# Patient Record
Sex: FEMALE | Race: WHITE | Employment: OTHER | URBAN - METROPOLITAN AREA
[De-identification: names, ages, dates, MRNs, and addresses within clinical notes are randomized per-mention and may not be internally consistent; named-entity substitution may affect disease eponyms.]

---

## 2017-02-17 ENCOUNTER — TELEPHONE (OUTPATIENT)
Dept: INTERNAL MEDICINE CLINIC | Age: 77
End: 2017-02-17

## 2017-02-17 DIAGNOSIS — M15.9 PRIMARY OSTEOARTHRITIS INVOLVING MULTIPLE JOINTS: Primary | ICD-10-CM

## 2017-02-17 RX ORDER — TRAMADOL HYDROCHLORIDE 50 MG/1
50 TABLET ORAL
Qty: 50 TAB | Refills: 0 | OUTPATIENT
Start: 2017-02-17 | End: 2017-10-30

## 2017-02-17 NOTE — TELEPHONE ENCOUNTER
Patient reports OTC tylenol arthritis and motrin are helping but some days neck pain is worse than others and would like a refill on her tramadol 50 mg tablets. Patient informed this was d/c at 9/2016 visit r/t not a current med but patient states she only takes it if absolutely needed and OTC meds are not helping. Please advise.

## 2017-05-15 ENCOUNTER — HOSPITAL ENCOUNTER (OUTPATIENT)
Dept: MAMMOGRAPHY | Age: 77
Discharge: HOME | End: 2017-05-15
Payer: MEDICARE

## 2017-05-15 DIAGNOSIS — Z12.31 VISIT FOR SCREENING MAMMOGRAM: ICD-10-CM

## 2017-05-15 PROCEDURE — G0202 SCR MAMMO BI INCL CAD: HCPCS

## 2017-06-15 DIAGNOSIS — F41.8 SITUATIONAL ANXIETY: ICD-10-CM

## 2017-06-15 RX ORDER — ALPRAZOLAM 0.25 MG/1
0.25 TABLET ORAL
Qty: 60 TAB | Refills: 0 | OUTPATIENT
Start: 2017-06-15 | End: 2017-08-28 | Stop reason: SDUPTHER

## 2017-06-15 NOTE — TELEPHONE ENCOUNTER
From: Beverly Butler  To:  Kenya Sy MD  Sent: 6/15/2017 7:35 AM EDT  Subject: Medication Renewal Request    Original authorizing provider: MD Beverly Jensen would like a refill of the following medications:  ALPRAZolam Penne Macho) 0.25 mg tablet Kenya Sy MD]    [facility] [phone]    Comment:  [facility] [address] [place] [address]

## 2017-08-28 DIAGNOSIS — F41.8 SITUATIONAL ANXIETY: ICD-10-CM

## 2017-08-28 RX ORDER — FAMOTIDINE 20 MG/1
20 TABLET, FILM COATED ORAL 2 TIMES DAILY
Qty: 60 TAB | Refills: 5 | Status: SHIPPED | OUTPATIENT
Start: 2017-08-28 | End: 2018-10-30

## 2017-08-28 RX ORDER — ALPRAZOLAM 0.25 MG/1
0.25 TABLET ORAL
Qty: 60 TAB | Refills: 0 | OUTPATIENT
Start: 2017-08-28 | End: 2017-10-30 | Stop reason: SDUPTHER

## 2017-08-28 NOTE — TELEPHONE ENCOUNTER
From: Kevin Purvis  To: Maranda Harding MD  Sent: 8/28/2017 8:07 AM EDT  Subject: Medication Renewal Request    Original authorizing provider: MD Kevin Santillan would like a refill of the following medications:  ALPRAZolam Brown Shaper) 0.25 mg tablet Maranda Harding MD]    Preferred pharmacy: [facility] #[id number] [facility] [address]    Comment:  Is it possible to refill a prescription that I have not used for about 3-4 years? My stomach has been acting like it did when I had acid reflux and I was prescribed Omeprazole, I would like to have it refilled if possible. We are now using the [facility] in [place] which allows us 3 months at a time on our prescriptions with our insurance.  My next appointment is October 30, 2017

## 2017-09-25 RX ORDER — FAMOTIDINE 20 MG/1
20 TABLET, FILM COATED ORAL 2 TIMES DAILY
Qty: 60 TAB | Refills: 5 | Status: CANCELLED | OUTPATIENT
Start: 2017-09-25

## 2017-10-30 ENCOUNTER — OFFICE VISIT (OUTPATIENT)
Dept: INTERNAL MEDICINE CLINIC | Age: 77
End: 2017-10-30

## 2017-10-30 VITALS
OXYGEN SATURATION: 99 % | HEART RATE: 57 BPM | BODY MASS INDEX: 20.58 KG/M2 | DIASTOLIC BLOOD PRESSURE: 62 MMHG | HEIGHT: 61 IN | RESPIRATION RATE: 16 BRPM | TEMPERATURE: 96.4 F | WEIGHT: 109 LBS | SYSTOLIC BLOOD PRESSURE: 129 MMHG

## 2017-10-30 DIAGNOSIS — E78.00 HYPERCHOLESTEROLEMIA: ICD-10-CM

## 2017-10-30 DIAGNOSIS — I47.1 SUPRAVENTRICULAR TACHYCARDIA (HCC): ICD-10-CM

## 2017-10-30 DIAGNOSIS — R20.0 NUMBNESS AND TINGLING OF BOTH FEET: ICD-10-CM

## 2017-10-30 DIAGNOSIS — G47.09 OTHER INSOMNIA: ICD-10-CM

## 2017-10-30 DIAGNOSIS — R20.2 NUMBNESS AND TINGLING OF BOTH FEET: ICD-10-CM

## 2017-10-30 DIAGNOSIS — Z00.00 ANNUAL PHYSICAL EXAM: Primary | ICD-10-CM

## 2017-10-30 DIAGNOSIS — F41.8 SITUATIONAL ANXIETY: ICD-10-CM

## 2017-10-30 RX ORDER — ZOLPIDEM TARTRATE 6.25 MG/1
6.25 TABLET, FILM COATED, EXTENDED RELEASE ORAL
Qty: 30 TAB | Refills: 2 | OUTPATIENT
Start: 2017-10-30 | End: 2018-03-27 | Stop reason: SDUPTHER

## 2017-10-30 RX ORDER — MELOXICAM 15 MG/1
TABLET ORAL
Refills: 6 | COMMUNITY
Start: 2017-10-12 | End: 2018-10-30 | Stop reason: SDUPTHER

## 2017-10-30 RX ORDER — FLUVASTATIN 20 MG/1
CAPSULE ORAL
Qty: 90 CAP | Refills: 3 | Status: SHIPPED | OUTPATIENT
Start: 2017-10-30 | End: 2018-03-27 | Stop reason: SDUPTHER

## 2017-10-30 RX ORDER — METOPROLOL SUCCINATE 25 MG/1
TABLET, EXTENDED RELEASE ORAL
Qty: 45 TAB | Refills: 3 | Status: SHIPPED | OUTPATIENT
Start: 2017-10-30 | End: 2018-03-27 | Stop reason: SDUPTHER

## 2017-10-30 RX ORDER — ALPRAZOLAM 0.25 MG/1
0.25 TABLET ORAL
Qty: 60 TAB | Refills: 1 | OUTPATIENT
Start: 2017-10-30 | End: 2018-03-27 | Stop reason: SDUPTHER

## 2017-10-30 NOTE — MR AVS SNAPSHOT
Visit Information Date & Time Provider Department Dept. Phone Encounter #  
 10/30/2017 10:30 AM Hailey Llamas MD Alvina Dolan [phone] [id number] Follow-up Instructions Return in about 1 year (around 10/30/2018) for Yearly exam, chol,  Fasting lab soon. Upcoming Health Maintenance Date Due  
 GLAUCOMA SCREENING Q2Y 6/23/2016 MEDICARE YEARLY EXAM 9/13/2017 DTaP/Tdap/Td series (2 - Td) 12/7/2017 BREAST CANCER SCRN MAMMOGRAM 5/15/2018 COLONOSCOPY 9/27/2023 Allergies as of 10/30/2017  Review Complete On: 10/30/2017 By: Jeanette Beckman LPN Severity Noted Reaction Type Reactions Zanaflex [Tizanidine]  03/09/2015   Side Effect Other (comments) Tired and head \"felt like it full of cotton. \" Current Immunizations  Reviewed on 10/30/2017 Name Date Influenza High Dose Vaccine PF 8/10/2017, 9/19/2016, 10/14/2015, 9/25/2014, 10/2/2013 Influenza Vaccine Split 11/1/2010 Pneumococcal Conjugate (PCV-13) 3/9/2015 TD Vaccine 2/17/2004 TDAP Vaccine 12/7/2007 ZZZ-RETIRED (DO NOT USE) Pneumococcal Vaccine (Unspecified Type) 12/7/2007, 7/1/1997 Zoster 1/1/2008 Reviewed by Jeantete Beckman LPN on 10/60/3808 at 10:33 AM  
You Were Diagnosed With   
  
 Codes Comments Annual physical exam    -  Primary ICD-10-CM: Z00.00 ICD-9-CM: V70.0 Hypercholesterolemia     ICD-10-CM: E78.00 ICD-9-CM: 272.0 Supraventricular tachycardia ([address]     ICD-10-CM: I47.1 ICD-9-CM: 427.89 Numbness and tingling of both feet     ICD-10-CM: R20.0, R20.2 ICD-9-CM: 782.0 Situational anxiety     ICD-10-CM: F41.8 ICD-9-CM: 300.09 Vitals BP Pulse Temp Resp Height(growth percentile) Weight(growth percentile) 129/62 (BP 1 Location: Left arm, BP Patient Position: Sitting) (!) 57 96.4 °F (35.8 °C) (Oral) 16 5' 1\" (1.549 m) 109 lb (49.4 kg) SpO2 BMI OB Status Smoking Status 99% 20.6 kg/m2 Hysterectomy Never Smoker BMI and BSA Data Body Mass Index Body Surface Area  
 20.6 kg/m 2 1.46 m 2 Preferred Pharmacy Pharmacy Name Phone [id number] Britney Posey, [address] [facility] [phone] Your Updated Medication List  
  
   
This list is accurate as of: 10/30/17 11:31 AM.  Always use your most recent med list.  
  
  
  
  
 ALPRAZolam 0.25 mg tablet Commonly known as:  Wendall Luna Take 1 Tab by mouth two (2) times daily as needed for Anxiety. CALCIUM-D PO Take 1 Tab by mouth daily. estradiol 1 mg tablet Commonly known as:  ESTRACE  
daily. famotidine 20 mg tablet Commonly known as:  PEPCID Take 1 Tab by mouth two (2) times a day. fluvastatin 20 mg capsule Commonly known as:  LESCOL TAKE ONE CAPSULE BY MOUTH AT BEDTIME  
  
 meloxicam 15 mg tablet Commonly known as:  MOBIC  
TAKE 1 TABLET BY MOUTH DAILY AS NEEDED  
  
 metoprolol succinate 25 mg XL tablet Commonly known as:  TOPROL-XL  
TAKE 1/2 TABLET DAILY PREMARIN 0.625 mg/gram vaginal cream  
Generic drug:  conjugated estrogens Use when needs vaginal exam  
  
 TYLENOL ARTHRITIS PAIN 650 mg CR tablet Generic drug:  acetaminophen Take 650 mg by mouth every six (6) hours as needed for Pain.  
  
 zolpidem CR 6.25 mg tablet Commonly known as:  AMBIEN CR Take 1 Tab by mouth nightly as needed for Sleep. Follow-up Instructions Return in about 1 year (around 10/30/2018) for Yearly exam, chol,  Fasting lab soon. To-Do List   
 10/31/2017 Lab:  LIPID PANEL   
  
 10/31/2017 Lab:  METABOLIC PANEL, COMPREHENSIVE   
  
 10/31/2017 Lab:  TSH REFLEX TO T4   
  
 10/31/2017 Lab:  VITAMIN B12 Introducing [facility]! Dear Jose Jin: Thank you for requesting a [employer] account. Our records indicate that you already have an active [employer] account.   You can access your account anytime at [email or web address] Did you know that you can access your hospital and ER discharge instructions at any time in [employer] Search? You can also review all of your test results from your hospital stay or ER visit. Additional Information If you have questions, please visit the Frequently Asked Questions section of the [employer] website at [email or web address] Remember, [employer] Search is NOT to be used for urgent needs. For medical emergencies, dial 911. Now available from your iPhone and Android! Please provide this summary of care documentation to your next provider. Your primary care clinician is listed as Rosas Taylor. If you have any questions after today's visit, please call [phone].

## 2017-10-30 NOTE — PROGRESS NOTES
Dorina Gitelman    Chief Complaint   Patient presents with    Cholesterol Problem       Reviewed record In preparation for visit and have obtained necessary documentation. She brought her advanced directive in.    1. Have you been to the ER, urgent care clinic or hospitalized since your last visit? No     2. Have you seen or consulted any other health care providers outside of the [address] [place] [address] since your last visit? Include any pap smears or colon screening. Mammogram, MRI    Vitals reviewed with provider.     Health Maintenance reviewed:

## 2017-10-30 NOTE — PROGRESS NOTES
HISTORY OF PRESENT ILLNESS  Zina Nieves is a 78 y.o. female. HPI  She presents for annual physical.   She presents for follow up of hyperlipidemia and SVT. She asks how necessary her medications are. Diet and Lifestyle: generally follows a low fat low cholesterol diet, generally follows a low sodium diet, exercises regularly, nonsmoker  Medication compliance: compliant all of the time  Medication side effects: none  Home BP Monitoring:  is well controlled at home, ranging 120's/66's  Cardiovascular ROS:  She complains of dizziness. When bending over. She denies palpitations, orthopnea, exertional chest pressure/discomfort, claudication, lower extremity edema, dyspnea on exertion     Pins and needles feeling in feet that is intermittent. No loss of sensation. Not limiting, but wanted to mention it. Dry cough, felt like throat dry, voice gets weak. Has \"drippy nose\" most of the time. Does not have it for past month, but had it for the 4 months prior to that. Saliva sometime collects in corners of mouth. She presents for follow up of anxiety. Ongoing symptoms include: insomnia, psychomotor agitation.  is very frustrating. He loses patience often. She does all of the driving and he is a back seat . Takes alprazolam every day. Patient denies: racing thoughts, feelings of losing control. Reported side effects from the treatment: none.       Patient Active Problem List   Diagnosis Code    Supraventricular tachycardia ([facility] [address] I47.1    Gastritis, chronic K29.50    Situational anxiety F41.8    Insomnia G47.00    Advance directive discussed with patient Z70.80    Hypercholesterolemia E78.00     Past Medical History:   Diagnosis Date    Arrhythmia     SVT    Chronic erosive gastritis     Menopause 1983    Neuropathy     Osteopenia     Postmenopausal HRT (hormone replacement therapy) 1983    SCC (squamous cell carcinoma), arm 2014    left upper arm     Past Surgical History: Procedure Laterality Date    ENDOSCOPY, COLON, DIAGNOSTIC      HX BREAST AUGMENTATION      HX HYSTERECTOMY  1983    HX OOPHORECTOMY Bilateral 1983    IMPLANT BREAST SILICONE/EQ Bilateral 1601     Social History     Social History    Marital status:      Spouse name: N/A    Number of children: N/A    Years of education: N/A     Social History Main Topics    Smoking status: Never Smoker    Smokeless tobacco: Never Used    Alcohol use No    Drug use: None    Sexual activity: Not Asked     Other Topics Concern    None     Social History Narrative     Family History   Problem Relation Age of Onset    Cancer Father      Lung Cancer    Alcohol abuse Mother      hepatic cirrhosis    Alcohol abuse Sister     Stroke Paternal Uncle      Allergies   Allergen Reactions    Zanaflex [Tizanidine] Other (comments)     Tired and head \"felt like it full of cotton. \"     Current Outpatient Prescriptions   Medication Sig Dispense Refill    meloxicam (MOBIC) 15 mg tablet TAKE 1 TABLET BY MOUTH DAILY AS NEEDED  6    ALPRAZolam (XANAX) 0.25 mg tablet Take 1 Tab by mouth two (2) times daily as needed for Anxiety. 60 Tab 0    metoprolol succinate (TOPROL-XL) 25 mg XL tablet TAKE 1/2 TABLET DAILY 15 Tab 11    fluvastatin (LESCOL) 20 mg capsule TAKE ONE CAPSULE BY MOUTH AT BEDTIME 30 Cap 11    zolpidem CR (AMBIEN CR) 6.25 mg tablet Take 1 Tab by mouth nightly as needed for Sleep. 30 Tab 2    acetaminophen (TYLENOL ARTHRITIS PAIN) 650 mg CR tablet Take 650 mg by mouth every six (6) hours as needed for Pain.  PREMARIN 0.625 mg/gram vaginal cream Use when needs vaginal exam  1    estradiol (ESTRACE) 1 mg tablet daily.  CALCIUM CARBONATE/VITAMIN D3 (CALCIUM-D PO) Take 1 Tab by mouth daily.  famotidine (PEPCID) 20 mg tablet Take 1 Tab by mouth two (2) times a day. 60 Tab 5             Review of Systems   Constitutional: Negative for malaise/fatigue and weight loss.    Gastrointestinal: Negative for constipation, diarrhea and heartburn. Musculoskeletal: Negative for back pain and joint pain. Neurological: Negative for dizziness, tingling and focal weakness. Visit Vitals    /62 (BP 1 Location: Left arm, BP Patient Position: Sitting)    Pulse (!) 57    Temp 96.4 °F (35.8 °C) (Oral)    Resp 16    Ht 5' 1\" (1.549 m)    Wt 109 lb (49.4 kg)    SpO2 99%    BMI 20.6 kg/m2     Physical Exam   Constitutional: She is oriented to person, place, and time. She appears well-developed and well-nourished. HENT:   Head: Normocephalic and atraumatic. Eyes: Conjunctivae are normal. Pupils are equal, round, and reactive to light. Neck: Neck supple. Carotid bruit is not present. No thyromegaly present. Cardiovascular: Normal rate, regular rhythm and normal heart sounds. PMI is not displaced. Exam reveals no gallop. No murmur heard. Pulses:       Dorsalis pedis pulses are 2+ on the right side, and 2+ on the left side. Posterior tibial pulses are 2+ on the right side, and 2+ on the left side. Pulmonary/Chest: Effort normal. She has no wheezes. She has no rhonchi. She has no rales. Abdominal: Soft. Normal appearance. She exhibits no abdominal bruit and no mass. There is no hepatosplenomegaly. There is no tenderness. Musculoskeletal: She exhibits no edema. Lymphadenopathy:     She has no cervical adenopathy. Right: No supraclavicular adenopathy present. Left: No supraclavicular adenopathy present. Neurological: She is alert and oriented to person, place, and time. No sensory deficit. Skin: Skin is warm, dry and intact. No rash noted. Psychiatric: She has a normal mood and affect. Her behavior is normal.   Nursing note and vitals reviewed.     Lab Results   Component Value Date/Time    Cholesterol, total 208 04/08/2016 11:02 AM    HDL Cholesterol 105 04/08/2016 11:02 AM    LDL, calculated 84 04/08/2016 11:02 AM    VLDL, calculated 19 04/08/2016 11:02 AM    Triglyceride 93 04/08/2016 11:02 AM       ASSESSMENT and PLAN    ICD-10-CM ICD-9-CM    1. Annual physical exam Z00.00 V70.0    2. Hypercholesterolemia E78.00 272.0 LIPID PANEL      METABOLIC PANEL, COMPREHENSIVE   3. Supraventricular tachycardia (HCC) I47.1 427.89    4. Numbness and tingling of both feet R20.0 782.0 VITAMIN B12    R20.2  TSH REFLEX TO T4   5. Situational anxiety F41.8 300.09      Diagnoses and all orders for this visit:    1. Annual physical exam    2. Hypercholesterolemia  Hyperlipidemia is controlled  -     LIPID PANEL; Future  -     METABOLIC PANEL, COMPREHENSIVE; Future    3. Supraventricular tachycardia (HCC)  Stable, but if stop metoprolol may recur. She agrees to continue metoprolol. 4. Numbness and tingling of both feet  She declines referral to evaluate for neuropathy, but agrees to lab work. -     VITAMIN B12; Future  -     TSH REFLEX TO T4; Future    5. Situational anxiety      Follow-up Disposition:  Return in about 1 year (around 10/30/2018) for Yearly exam, chol,  Fasting lab soon. lab results and schedule of future lab studies reviewed with patient  reviewed diet, exercise and weight control  cardiovascular risk and specific lipid/LDL goals reviewed  I have discussed the diagnosis with the patient and the intended plan as seen in the above orders. Patient is in agreement. The patient has received an after-visit summary and questions were answered concerning future plans. I have discussed medication side effects and warnings with the patient as well.

## 2017-10-31 ENCOUNTER — APPOINTMENT (OUTPATIENT)
Dept: INTERNAL MEDICINE CLINIC | Age: 77
End: 2017-10-31

## 2017-10-31 DIAGNOSIS — E78.00 HYPERCHOLESTEROLEMIA: ICD-10-CM

## 2017-10-31 DIAGNOSIS — R20.2 NUMBNESS AND TINGLING OF BOTH FEET: ICD-10-CM

## 2017-10-31 DIAGNOSIS — R20.0 NUMBNESS AND TINGLING OF BOTH FEET: ICD-10-CM

## 2017-11-01 LAB
ALBUMIN SERPL-MCNC: 3.8 G/DL (ref 3.5–4.8)
ALBUMIN/GLOB SERPL: 1.7 {RATIO} (ref 1.2–2.2)
ALP SERPL-CCNC: 40 IU/L (ref 39–117)
ALT SERPL-CCNC: 7 IU/L (ref 0–32)
AST SERPL-CCNC: 18 IU/L (ref 0–40)
BILIRUB SERPL-MCNC: 0.4 MG/DL (ref 0–1.2)
BUN SERPL-MCNC: 12 MG/DL (ref 8–27)
BUN/CREAT SERPL: 14 (ref 12–28)
CALCIUM SERPL-MCNC: 8.9 MG/DL (ref 8.7–10.3)
CHLORIDE SERPL-SCNC: 107 MMOL/L (ref 96–106)
CHOLEST SERPL-MCNC: 208 MG/DL (ref 100–199)
CO2 SERPL-SCNC: 23 MMOL/L (ref 18–29)
CREAT SERPL-MCNC: 0.87 MG/DL (ref 0.57–1)
GFR SERPLBLD CREATININE-BSD FMLA CKD-EPI: 66 ML/MIN/1.73
GFR SERPLBLD CREATININE-BSD FMLA CKD-EPI: 76 ML/MIN/1.73
GLOBULIN SER CALC-MCNC: 2.2 G/DL (ref 1.5–4.5)
GLUCOSE SERPL-MCNC: 86 MG/DL (ref 65–99)
HDLC SERPL-MCNC: 99 MG/DL
INTERPRETATION, 910389: NORMAL
LDLC SERPL CALC-MCNC: 92 MG/DL (ref 0–99)
POTASSIUM SERPL-SCNC: 4.9 MMOL/L (ref 3.5–5.2)
PROT SERPL-MCNC: 6 G/DL (ref 6–8.5)
SODIUM SERPL-SCNC: 145 MMOL/L (ref 134–144)
TRIGL SERPL-MCNC: 87 MG/DL (ref 0–149)
TSH SERPL DL<=0.005 MIU/L-ACNC: 3.64 UIU/ML (ref 0.45–4.5)
VIT B12 SERPL-MCNC: 401 PG/ML (ref 211–946)
VLDLC SERPL CALC-MCNC: 17 MG/DL (ref 5–40)

## 2017-11-01 NOTE — PROGRESS NOTES
Results are essentially normal. Sodium and chloride are only one point high and that is due to this being a fasting sample. Patient informed in My Chart.

## 2018-03-27 DIAGNOSIS — G47.09 OTHER INSOMNIA: ICD-10-CM

## 2018-03-27 DIAGNOSIS — I47.1 SUPRAVENTRICULAR TACHYCARDIA (HCC): ICD-10-CM

## 2018-03-27 DIAGNOSIS — F41.8 SITUATIONAL ANXIETY: ICD-10-CM

## 2018-03-27 DIAGNOSIS — E78.00 HYPERCHOLESTEROLEMIA: Primary | ICD-10-CM

## 2018-03-27 NOTE — TELEPHONE ENCOUNTER
From: James Jernigan  To:  Michelle Wilson MD  Sent: 3/27/2018 9:36 AM EDT  Subject: Medication Renewal Request    Original authorizing provider: MD James Condon would like a refill of the following medications:  zolpidem CR (AMBIEN CR) 6.25 mg tablet Michelle Wilson MD]  ALPRAZolam Nasra Shy) 0.25 mg tablet Michelle Wilson MD]  metoprolol succinate (TOPROL-XL) 25 mg XL tablet Michelle Wilson MD]  fluvastatin (LESCOL) 20 mg capsule Michelle Wilson MD]    Preferred pharmacy: [facility] #[id number] - [address] [facility]    Comment:

## 2018-03-28 RX ORDER — ALPRAZOLAM 0.25 MG/1
0.25 TABLET ORAL
Qty: 60 TAB | Refills: 1 | OUTPATIENT
Start: 2018-03-28 | End: 2018-10-30 | Stop reason: SDUPTHER

## 2018-03-28 RX ORDER — METOPROLOL SUCCINATE 25 MG/1
TABLET, EXTENDED RELEASE ORAL
Qty: 45 TAB | Refills: 3 | Status: SHIPPED | OUTPATIENT
Start: 2018-03-28 | End: 2019-03-11 | Stop reason: SDUPTHER

## 2018-03-28 RX ORDER — ZOLPIDEM TARTRATE 6.25 MG/1
6.25 TABLET, FILM COATED, EXTENDED RELEASE ORAL
Qty: 30 TAB | Refills: 2 | OUTPATIENT
Start: 2018-03-28 | End: 2018-10-30 | Stop reason: SDUPTHER

## 2018-03-28 RX ORDER — FLUVASTATIN 20 MG/1
CAPSULE ORAL
Qty: 90 CAP | Refills: 3 | Status: SHIPPED | OUTPATIENT
Start: 2018-03-28 | End: 2018-11-23 | Stop reason: SDUPTHER

## 2018-08-07 ENCOUNTER — HOSPITAL ENCOUNTER (OUTPATIENT)
Dept: MAMMOGRAPHY | Age: 78
Discharge: HOME | End: 2018-08-07
Payer: MEDICARE

## 2018-08-07 DIAGNOSIS — Z12.39 SCREENING BREAST EXAMINATION: ICD-10-CM

## 2018-08-07 PROCEDURE — 77067 SCR MAMMO BI INCL CAD: CPT

## 2018-10-30 ENCOUNTER — OFFICE VISIT (OUTPATIENT)
Dept: INTERNAL MEDICINE CLINIC | Facility: CLINIC | Age: 78
End: 2018-10-30

## 2018-10-30 VITALS
BODY MASS INDEX: 20.48 KG/M2 | SYSTOLIC BLOOD PRESSURE: 138 MMHG | TEMPERATURE: 97.5 F | HEART RATE: 68 BPM | RESPIRATION RATE: 12 BRPM | OXYGEN SATURATION: 98 % | WEIGHT: 108.5 LBS | HEIGHT: 61 IN | DIASTOLIC BLOOD PRESSURE: 70 MMHG

## 2018-10-30 DIAGNOSIS — Z00.00 ANNUAL PHYSICAL EXAM: Primary | ICD-10-CM

## 2018-10-30 DIAGNOSIS — Z23 ENCOUNTER FOR IMMUNIZATION: ICD-10-CM

## 2018-10-30 DIAGNOSIS — Z13.1 SCREENING FOR DIABETES MELLITUS: ICD-10-CM

## 2018-10-30 DIAGNOSIS — G47.09 OTHER INSOMNIA: ICD-10-CM

## 2018-10-30 DIAGNOSIS — I47.1 SUPRAVENTRICULAR TACHYCARDIA (HCC): ICD-10-CM

## 2018-10-30 DIAGNOSIS — Z71.89 ADVANCE DIRECTIVE DISCUSSED WITH PATIENT: ICD-10-CM

## 2018-10-30 DIAGNOSIS — F41.8 SITUATIONAL ANXIETY: ICD-10-CM

## 2018-10-30 DIAGNOSIS — E78.00 HYPERCHOLESTEROLEMIA: ICD-10-CM

## 2018-10-30 DIAGNOSIS — M54.2 NECK PAIN: ICD-10-CM

## 2018-10-30 RX ORDER — ALPRAZOLAM 0.25 MG/1
TABLET ORAL
Qty: 60 TAB | Refills: 0 | OUTPATIENT
Start: 2018-10-30 | End: 2019-01-21 | Stop reason: SDUPTHER

## 2018-10-30 RX ORDER — MELOXICAM 15 MG/1
TABLET ORAL
Qty: 30 TAB | Refills: 6 | Status: SHIPPED | OUTPATIENT
Start: 2018-10-30 | End: 2019-05-07 | Stop reason: SDUPTHER

## 2018-10-30 RX ORDER — ZOLPIDEM TARTRATE 6.25 MG/1
TABLET, FILM COATED, EXTENDED RELEASE ORAL
Qty: 30 TAB | Refills: 0 | OUTPATIENT
Start: 2018-10-30 | End: 2018-11-29 | Stop reason: CLARIF

## 2018-10-30 NOTE — PATIENT INSTRUCTIONS
Will decide about restarting Lescol after a lipid panel is done Vaccine Information Statement Tdap (Tetanus, Diphtheria, Pertussis) Vaccine: What You Need to Know Many Vaccine Information Statements are available in Ethiopian and other languages. See www.immunize.org/vis. Hojas de Información Sobre Vacunas están disponibles en español y en muchos otros idiomas. Visite WorthScale.si 1. Why get vaccinated? Tetanus, diphtheria, and pertussis are very serious diseases. Tdap vaccine can protect us from these diseases. And, Tdap vaccine given to pregnant women can protect  babies against pertussis. TETANUS (Lockjaw) is rare in the [facility] today. It causes painful muscle tightening and stiffness, usually all over the body. ? It can lead to tightening of muscles in the head and neck so you cant open your mouth, swallow, or sometimes even breathe. Tetanus kills about 1 out of 10 people who are infected even after receiving the best medical care. DIPHTHERIA is also rare in the [facility] today. It can cause a thick coating to form in the back of the throat. ? It can lead to breathing problems, heart failure, paralysis, and death. PERTUSSIS (Whooping Cough) causes severe coughing spells, which can cause difficulty breathing, vomiting, and disturbed sleep. ? It can also lead to weight loss, incontinence, and rib fractures. Up to 2 in 100 adolescents and 5 in 100 adults with pertussis are hospitalized or have complications, which could include pneumonia or death. These diseases are caused by bacteria. Diphtheria and pertussis are spread from person to person through secretions from coughing or sneezing. Tetanus enters the body through cuts, scratches, or wounds.  
 
Before vaccines, as many as 200,000 cases of diphtheria, 200,000 cases of pertussis, and hundreds of cases of tetanus, were reported in the [facility] each year. Since vaccination began, reports of cases for tetanus and diphtheria have dropped by about 99% and for pertussis by about 80%. 2. Tdap vaccine Tdap vaccine can protect adolescents and adults from tetanus, diphtheria, and pertussis. One dose of Tdap is routinely given at age 6 or 15. People who did not get Tdap at that age should get it as soon as possible. Tdap is especially important for health care professionals and anyone having close contact with a baby younger than 12 months. Pregnant women should get a dose of Tdap during every pregnancy, to protect the  from pertussis. Infants are most at risk for severe, life-threatening complications from pertussis. Another vaccine, called Td, protects against tetanus and diphtheria, but not pertussis. A Td booster should be given every 10 years. Tdap may be given as one of these boosters if you have never gotten Tdap before. Tdap may also be given after a severe cut or burn to prevent tetanus infection. Your doctor or the person giving you the vaccine can give you more information. Tdap may safely be given at the same time as other vaccines. 3. Some people should not get this vaccine  A person who has ever had a life-threatening allergic reaction after a previous dose of any diphtheria, tetanus or pertussis containing vaccine, OR has a severe allergy to any part of this vaccine, should not get Tdap vaccine. Tell the person giving the vaccine about any severe allergies.  Anyone who had coma or long repeated seizures within 7 days after a childhood dose of DTP or DTaP, or a previous dose of Tdap, should not get Tdap, unless a cause other than the vaccine was found. They can still get Td.  
 
 Talk to your doctor if you: 
- have seizures or another nervous system problem, 
- had severe pain or swelling after any vaccine containing diphtheria, tetanus or pertussis,  
 - ever had a condition called Guillain Barré Syndrome (GBS), 
- arent feeling well on the day the shot is scheduled. 4. Risks With any medicine, including vaccines, there is a chance of side effects. These are usually mild and go away on their own. Serious reactions are also possible but are rare. Most people who get Tdap vaccine do not have any problems with it. Mild Problems following Tdap 
(Did not interfere with activities)  Pain where the shot was given (about 3 in 4 adolescents or 2 in 3 adults)  Redness or swelling where the shot was given (about 1 person in 5)  Mild fever of at least 100.4°F (up to about 1 in 25 adolescents or 1 in 100 adults)  Headache (about 3 or 4 people in 10)  Tiredness (about 1 person in 3 or 4)  Nausea, vomiting, diarrhea, stomach ache (up to 1 in 4 adolescents or 1 in 10 adults)  Chills,  sore joints (about 1 person in 10)  Body aches (about 1 person in 3 or 4)  Rash, swollen glands (uncommon) Moderate Problems following Tdap (Interfered with activities, but did not require medical attention)  Pain where the shot was given (up to 1 in 5 or 6)  Redness or swelling where the shot was given (up to about 1 in 16 adolescents or 1 in 12 adults)  Fever over 102°F (about 1 in 100 adolescents or 1 in 250 adults)  Headache (about 1 in 7 adolescents or 1 in 10 adults)  Nausea, vomiting, diarrhea, stomach ache (up to 1 or 3 people in 100)  Swelling of the entire arm where the shot was given (up to about 1 in 500). Severe Problems following Tdap 
(Unable to perform usual activities; required medical attention)  Swelling, severe pain, bleeding, and redness in the arm where the shot was given (rare). Problems that could happen after any vaccine:  People sometimes faint after a medical procedure, including vaccination.  Sitting or lying down for about 15 minutes can help prevent fainting, and injuries caused by a fall. Tell your doctor if you feel dizzy, or have vision changes or ringing in the ears.  Some people get severe pain in the shoulder and have difficulty moving the arm where a shot was given. This happens very rarely.  Any medication can cause a severe allergic reaction. Such reactions from a vaccine are very rare, estimated at fewer than 1 in a million doses, and would happen within a few minutes to a few hours after the vaccination. As with any medicine, there is a very remote chance of a vaccine causing a serious injury or death. The safety of vaccines is always being monitored. For more information, visit: www.cdc.gov/vaccinesafety/ 
 
 
The [facility] Vaccine Injury Compensation Program (VICP) is a federal program that was created to compensate people who may have been injured by certain vaccines.  
 
Persons who believe they may have been injured by a vaccine can learn about the program and about filing a claim by calling 0-547.711.2673 or visiting the [address] website at www.[facility].gov/vaccinecompensation. There is a time limit to file a claim for compensation. 7. How can I learn more?  Ask your doctor. He or she can give you the vaccine package insert or suggest other sources of information.  Call your local or state health department.  Contact the Centers for Disease Control and Prevention (CDC): 
- Call 5-264.875.6057 (1-800-CDC-INFO) or 
- Visit CDCs website at www.cdc.gov/vaccines Vaccine Information Statement Tdap Vaccine 
(2/24/2015) [address] [facility] [id number] Department of Health and [employer] Centers for Disease Control and Prevention Office Use Only

## 2018-10-30 NOTE — PROGRESS NOTES
HISTORY OF PRESENT ILLNESS Mariann Barrios is a 78 y.o. female. HPI  She presents for follow up of hyperlipidemia and SVT. Diet and Lifestyle: generally follows a low fat low cholesterol diet, generally follows a low sodium diet, exercises sporadically, nonsmoker Medication compliance: compliant all of the time  Pharmacy did not call her about refill on Lescol. They were supposed to call when they has it in stock . Has been without for just over a month. Medication side effects: none Home BP Monitoring: not done Cardiovascular ROS: 
She complains of lower extremity edema (left leg chronic unchanged), dizziness. Dizzy when bends over. She denies palpitations, orthopnea, exertional chest pressure/discomfort, claudication, dyspnea on exertion PHQ over the last two weeks 10/30/2018 Little interest or pleasure in doing things Not at all Feeling down, depressed, irritable, or hopeless Not at all Total Score PHQ 2 0 Review of Systems Constitutional: Negative for malaise/fatigue and weight loss. Gastrointestinal: Negative for constipation and diarrhea. Musculoskeletal: Positive for neck pain. Negative for back pain and joint pain. Skin: Negative for itching and rash. Neurological: Negative for tingling and focal weakness. Visit Vitals /70 (BP 1 Location: Left arm, BP Patient Position: Sitting) Pulse 68 Temp 97.5 °F (36.4 °C) (Oral) Resp 12 Ht 5' 1\" (1.549 m) Wt 108 lb 8 oz (49.2 kg) SpO2 98% BMI 20.50 kg/m² Physical Exam  
Constitutional: She is oriented to person, place, and time. She appears well-developed and well-nourished. HENT:  
Head: Normocephalic and atraumatic. Eyes: Conjunctivae are normal. Pupils are equal, round, and reactive to light. Neck: Neck supple. Carotid bruit is not present. No thyromegaly present. Cardiovascular: Normal rate, regular rhythm and normal heart sounds. PMI is not displaced. Exam reveals no gallop. No murmur heard. Pulses: 
     Dorsalis pedis pulses are 2+ on the right side, and 2+ on the left side. Posterior tibial pulses are 2+ on the right side, and 2+ on the left side. Pulmonary/Chest: Effort normal. She has no wheezes. She has no rhonchi. She has no rales. Abdominal: Soft. Normal appearance. She exhibits no abdominal bruit and no mass. There is no hepatosplenomegaly. There is no tenderness. Musculoskeletal: She exhibits no edema. Lymphadenopathy:  
  She has no cervical adenopathy. Right: No supraclavicular adenopathy present. Left: No supraclavicular adenopathy present. Neurological: She is alert and oriented to person, place, and time. No sensory deficit. Skin: Skin is warm, dry and intact. No rash noted. Psychiatric: She has a normal mood and affect. Her behavior is normal.  
Nursing note and vitals reviewed. Results for orders placed or performed in visit on 10/31/17 LIPID PANEL Result Value Ref Range Cholesterol, total 208 (H) 100 - 199 mg/dL Triglyceride 87 0 - 149 mg/dL HDL Cholesterol 99 >39 mg/dL VLDL, calculated 17 5 - 40 mg/dL LDL, calculated 92 0 - 99 mg/dL METABOLIC PANEL, COMPREHENSIVE Result Value Ref Range Glucose 86 65 - 99 mg/dL BUN 12 8 - 27 mg/dL Creatinine 0.87 0.57 - 1.00 mg/dL GFR est non-AA 66 >59 mL/min/1.73 GFR est AA 76 >59 mL/min/1.73  
 BUN/Creatinine ratio 14 12 - 28 Sodium 145 (H) 134 - 144 mmol/L Potassium 4.9 3.5 - 5.2 mmol/L Chloride 107 (H) 96 - 106 mmol/L  
 CO2 23 18 - 29 mmol/L Calcium 8.9 8.7 - 10.3 mg/dL Protein, total 6.0 6.0 - 8.5 g/dL Albumin 3.8 3.5 - 4.8 g/dL GLOBULIN, TOTAL 2.2 1.5 - 4.5 g/dL A-G Ratio 1.7 1.2 - 2.2 Bilirubin, total 0.4 0.0 - 1.2 mg/dL Alk. phosphatase 40 39 - 117 IU/L  
 AST (SGOT) 18 0 - 40 IU/L  
 ALT (SGPT) 7 0 - 32 IU/L  
VITAMIN B12 Result Value Ref Range Vitamin B12 401 211 - 946 pg/mL TSH REFLEX TO T4  
 Result Value Ref Range TSH 3.640 0.450 - 4.500 uIU/mL CVD REPORT Result Value Ref Range INTERPRETATION Note ASSESSMENT and PLAN 
  ICD-10-CM ICD-9-CM 1. Annual physical exam Z00.00 V70.0 2. Advance directive discussed with patient Z71.89 V65.49   
3. Hypercholesterolemia E78.00 272.0 LIPID PANEL 4. Supraventricular tachycardia (HCC) I47.1 427.89   
5. Screening for diabetes mellitus Y50.9 N28.7 METABOLIC PANEL, BASIC 6. Encounter for immunization Z23 V03.89 TETANUS, DIPHTHERIA TOXOIDS AND ACELLULAR PERTUSSIS VACCINE (TDAP), IN INDIVIDS. >=7, IM  
   OH IMMUNIZ ADMIN,1 SINGLE/COMB VAC/TOXOID 7. Neck pain M54.2 723.1 meloxicam (MOBIC) 15 mg tablet Diagnoses and all orders for this visit: 
 
1. Annual physical exam 
 
2. Advance directive discussed with patient 3. Hypercholesterolemia Hyperlipidemia is controlled -     LIPID PANEL; Future 4. Supraventricular tachycardia ([address] Stable/controlled 5. Screening for diabetes mellitus -     METABOLIC PANEL, BASIC; Future 6. Encounter for immunization -     TETANUS, DIPHTHERIA TOXOIDS AND ACELLULAR PERTUSSIS VACCINE (TDAP), IN INDIVIDS. >=7, IM 
-     OH IMMUNIZ ADMIN,1 SINGLE/COMB VAC/TOXOID 7. Neck pain Intermittent-- Stable. -     meloxicam (MOBIC) 15 mg tablet; TAKE 1 TABLET BY MOUTH DAILY AS NEEDED Follow-up Disposition: 
Return in about 1 year (around 10/30/2019) for chol, Annual CPE . 
lab results and schedule of future lab studies reviewed with patient 
reviewed diet, exercise and weight control 
cardiovascular risk and specific lipid/LDL goals reviewed I have discussed the diagnosis, evaluation and treatment options and the intended plan with the patient. Patient understands and is in agreement. The patient has received an after-visit summary and questions were answered concerning future plans. I have discussed side effects and warnings of any new medications with the patient as well.

## 2018-10-30 NOTE — PROGRESS NOTES
Nicanor Krueger  Identified pt with two pt identifiers(name and ). Chief Complaint Patient presents with  Cholesterol Problem  Immunization/Injection Reviewed record In preparation for visit and have obtained necessary documentation. Advanced directive / living will on file. 1. Have you been to the ER, urgent care clinic or hospitalized since your last visit? No  
 
2. Have you seen or consulted any other health care providers outside of the [address] [place] [address] since your last visit? Include any pap smears or colon screening. Dexa, Dr Shayne Peters, gyn Vitals reviewed with provider. Health Maintenance reviewed: After verbal order read back of , patient received TDAP  in left arm. Boostrix 0.5ml [facility]  [id number] lot  exp 2021. Patient tolerated procedure without complaints and received VIS. Health Maintenance Due Topic  Shingrix Vaccine Age 50> (1 of 2)  GLAUCOMA SCREENING Q2Y   
 DTaP/Tdap/Td series (2 - Td)  MEDICARE YEARLY EXAM   
  
 
 
Wt Readings from Last 3 Encounters:  
10/30/18 108 lb 8 oz (49.2 kg) 10/30/17 109 lb (49.4 kg) 16 105 lb (47.6 kg) Temp Readings from Last 3 Encounters:  
10/30/18 97.5 °F (36.4 °C) (Oral) 10/30/17 96.4 °F (35.8 °C) (Oral) 16 97.1 °F (36.2 °C) (Oral) BP Readings from Last 3 Encounters:  
10/30/18 138/70  
10/30/17 129/62  
16 122/74 Pulse Readings from Last 3 Encounters:  
10/30/18 68  
10/30/17 (!) 57  
16 (!) 50 Vitals:  
 10/30/18 1100 BP: 138/70 Pulse: 68 Resp: 12 Temp: 97.5 °F (36.4 °C) TempSrc: Oral  
SpO2: 98% Weight: 108 lb 8 oz (49.2 kg) Height: 5' 1\" (1.549 m) Learning Assessment:  
:  
 
 
Learning Assessment 2015 PRIMARY LEARNER Patient Patient BARRIERS PRIMARY LEARNER - NONE  
CO-LEARNER CAREGIVER - No  
PRIMARY LANGUAGE ENGLISH ENGLISH  NEED - No  
 LEARNER PREFERENCE PRIMARY READING DEMONSTRATION  
ANSWERED BY patient patient RELATIONSHIP SELF SELF Depression Screening:  
:  
 
 
PHQ over the last two weeks 10/30/2018 Little interest or pleasure in doing things Not at all Feeling down, depressed, irritable, or hopeless Not at all Total Score PHQ 2 0 Fall Risk Assessment:  
:  
 
 
Fall Risk Assessment, last 12 mths 10/30/2018 Able to walk? Yes Fall in past 12 months? No  
  
 
Abuse Screening:  
:  
 
 
Abuse Screening Questionnaire 10/30/2018 10/30/2017 7/14/2014 Do you ever feel afraid of your partner? N N N Are you in a relationship with someone who physically or mentally threatens you? N N N Is it safe for you to go home? Sagar Simon  
  
 
ADL Screening:  
:  
 
 

## 2018-10-30 NOTE — PROGRESS NOTES
This is the Subsequent Medicare Annual Wellness Exam, performed 12 months or more after the Initial AWV or the last Subsequent AWV I have reviewed the patient's medical history in detail and updated the computerized patient record.

## 2018-11-02 DIAGNOSIS — E78.00 HYPERCHOLESTEROLEMIA: ICD-10-CM

## 2018-11-02 DIAGNOSIS — Z13.1 SCREENING FOR DIABETES MELLITUS: ICD-10-CM

## 2018-11-03 LAB
BUN SERPL-MCNC: 18 MG/DL (ref 8–27)
BUN/CREAT SERPL: 20 (ref 12–28)
CALCIUM SERPL-MCNC: 9.4 MG/DL (ref 8.7–10.3)
CHLORIDE SERPL-SCNC: 105 MMOL/L (ref 96–106)
CHOLEST SERPL-MCNC: 199 MG/DL (ref 100–199)
CO2 SERPL-SCNC: 25 MMOL/L (ref 20–29)
CREAT SERPL-MCNC: 0.9 MG/DL (ref 0.57–1)
GLUCOSE SERPL-MCNC: 88 MG/DL (ref 65–99)
HDLC SERPL-MCNC: 88 MG/DL
LDLC SERPL CALC-MCNC: 100 MG/DL (ref 0–99)
POTASSIUM SERPL-SCNC: 4.5 MMOL/L (ref 3.5–5.2)
SODIUM SERPL-SCNC: 142 MMOL/L (ref 134–144)
TRIGL SERPL-MCNC: 56 MG/DL (ref 0–149)
VLDLC SERPL CALC-MCNC: 11 MG/DL (ref 5–40)

## 2018-11-23 DIAGNOSIS — E78.00 HYPERCHOLESTEROLEMIA: ICD-10-CM

## 2018-11-23 RX ORDER — FLUVASTATIN 20 MG/1
CAPSULE ORAL
Qty: 90 CAP | Refills: 3 | Status: SHIPPED | OUTPATIENT
Start: 2018-11-23 | End: 2018-12-13 | Stop reason: RX

## 2018-11-23 NOTE — TELEPHONE ENCOUNTER
PCP: Tova Schlatter, MD     Last appt: 11/2/2018   Future Appointments   Date Time Provider Sharon Calzada   10/31/2019 10:30 AM Tova Schlatter,  W. [facility]        Requested Prescriptions     Pending Prescriptions Disp Refills    fluvastatin (LESCOL) 20 mg capsule 90 Cap 3     Sig: TAKE ONE CAPSULE BY MOUTH AT BEDTIME

## 2018-11-29 DIAGNOSIS — G47.09 OTHER INSOMNIA: ICD-10-CM

## 2018-11-29 RX ORDER — ZOLPIDEM TARTRATE 6.25 MG/1
TABLET, FILM COATED, EXTENDED RELEASE ORAL
Qty: 30 TAB | Refills: 0 | OUTPATIENT
Start: 2018-11-29

## 2018-11-29 RX ORDER — ZOLPIDEM TARTRATE 5 MG/1
5 TABLET ORAL
Qty: 30 TAB | Refills: 2 | OUTPATIENT
Start: 2018-11-29 | End: 2021-12-01

## 2018-11-29 NOTE — TELEPHONE ENCOUNTER
printed.   PCP: Pearl Peters MD     Last appt: 11/2/2018   Future Appointments   Date Time Provider Sharon Calzada   10/31/2019 10:30 AM Pearl Peters MD [facility]        Requested Prescriptions     Pending Prescriptions Disp Refills    zolpidem CR (AMBIEN CR) 6.25 mg tablet 30 Tab 0     Sig: TAKE 1 TABLET BY MOUTH AT BEDTIME

## 2018-12-12 ENCOUNTER — TELEPHONE (OUTPATIENT)
Dept: INTERNAL MEDICINE CLINIC | Facility: CLINIC | Age: 78
End: 2018-12-12

## 2018-12-12 DIAGNOSIS — E78.00 HYPERCHOLESTEROLEMIA: Primary | ICD-10-CM

## 2018-12-12 NOTE — TELEPHONE ENCOUNTER
Pharmacist called and stated that the following medication is not available through the  and they do not have another medication to give her. They would like a call back to pick another Statin medication. Pharmacy can be reached at [phone].       fluvastatin (LESCOL) 20 mg capsule    11/23/18  -- Angeles Ford MD     TAKE ONE CAPSULE BY MOUTH AT BEDTIME

## 2018-12-13 RX ORDER — PRAVASTATIN SODIUM 20 MG/1
20 TABLET ORAL
Qty: 30 TAB | Refills: 11 | Status: SHIPPED | OUTPATIENT
Start: 2018-12-13 | End: 2019-07-07 | Stop reason: SDUPTHER

## 2019-01-21 DIAGNOSIS — F41.8 SITUATIONAL ANXIETY: ICD-10-CM

## 2019-01-21 PROBLEM — F41.9 ANXIETY: Status: ACTIVE | Noted: 2019-01-21

## 2019-01-21 RX ORDER — ALPRAZOLAM 0.25 MG/1
TABLET ORAL
Qty: 60 TAB | Refills: 0 | OUTPATIENT
Start: 2019-01-21 | End: 2019-10-31

## 2019-03-11 DIAGNOSIS — I47.1 SUPRAVENTRICULAR TACHYCARDIA (HCC): ICD-10-CM

## 2019-03-11 RX ORDER — METOPROLOL SUCCINATE 25 MG/1
TABLET, EXTENDED RELEASE ORAL
Qty: 45 TAB | Refills: 3 | Status: SHIPPED | OUTPATIENT
Start: 2019-03-11 | End: 2020-03-16

## 2019-05-01 ENCOUNTER — TELEPHONE (OUTPATIENT)
Dept: INTERNAL MEDICINE CLINIC | Facility: CLINIC | Age: 79
End: 2019-05-01

## 2019-05-01 NOTE — TELEPHONE ENCOUNTER
[facility] pharmacy called because pt wants the shingles vaccine but her insurance will not cover it unless the doctor does a prior auth first.

## 2019-05-02 NOTE — TELEPHONE ENCOUNTER
Per [facility] shingrix is not covered thru her insurance, pt will have to pay out of pocket. She will call insurance as she is was a [employer].

## 2019-05-07 DIAGNOSIS — M54.2 NECK PAIN: ICD-10-CM

## 2019-05-07 RX ORDER — MELOXICAM 15 MG/1
TABLET ORAL
Qty: 30 TAB | Refills: 6 | Status: SHIPPED | OUTPATIENT
Start: 2019-05-07 | End: 2019-11-17 | Stop reason: SDUPTHER

## 2019-07-07 DIAGNOSIS — E78.00 HYPERCHOLESTEROLEMIA: ICD-10-CM

## 2019-07-07 RX ORDER — PRAVASTATIN SODIUM 20 MG/1
TABLET ORAL
Qty: 90 TAB | Refills: 2 | Status: SHIPPED | OUTPATIENT
Start: 2019-07-07 | End: 2020-03-11

## 2019-08-02 DIAGNOSIS — M54.2 NECK PAIN: ICD-10-CM

## 2019-08-02 RX ORDER — MELOXICAM 15 MG/1
TABLET ORAL
Qty: 90 TAB | Refills: 3 | OUTPATIENT
Start: 2019-08-02

## 2019-08-02 NOTE — TELEPHONE ENCOUNTER
This was not intended for long term use. It raises blood pressure, increases risk of heart attack and kidney disease and increases risk of intestinal bleeding. If she has pain that is not made tolerable by Tylenol, she needs an OV.

## 2019-08-08 ENCOUNTER — HOSPITAL ENCOUNTER (OUTPATIENT)
Dept: MAMMOGRAPHY | Age: 79
Discharge: HOME | End: 2019-08-08
Payer: COMMERCIAL

## 2019-08-08 DIAGNOSIS — Z12.39 SCREENING BREAST EXAMINATION: ICD-10-CM

## 2019-08-08 PROCEDURE — 77067 SCR MAMMO BI INCL CAD: CPT

## 2019-10-30 NOTE — PROGRESS NOTES
HISTORY OF PRESENT ILLNESS  Jaime Alanis is a 70 y.o. female. HPI  She presents for yearly visit. She presents for follow up of hyperlipidemia and SVT. Diet and Lifestyle: generally follows a low fat low cholesterol diet, generally follows a low sodium diet, exercises sporadically, nonsmoker  Medication compliance: compliant all of the time  Medication side effects: none  Home BP Monitorin-140/70's  Cardiovascular ROS:  She complains of lower extremity edema. (left leg only-- unchanged)   She denies palpitations, orthopnea, exertional chest pressure/discomfort, claudication, dyspnea on exertion, dizziness     She presents for follow up of anxiety and insomnia Current therapy includes: alprazolam and zolpidem, but has not taken wither recently. With therapy symptoms are improved. Ongoing symptoms include:  decreased sleep  Patient denies: fear of impending doom, feelings of apprehension/worry, poor concentration/attention, racing thoughts and restlessness   Reported side effects from the treatment: feelt worse after taking alprazolam, so stopped usuing it . Kasie Carney Feet tingle constantly for years, but recently it seem worse, especilly in the evening. She complains of 3 months of sinus congestion, green drainage. . Associated symptoms include headache described as itchy eyes, sneezing. She denies achiness, non productive cough, shortness of breath, sore throat and wheezing, fever, chills, or night sweats. Clinical course has been unchanged since that time. She has tried no medications. Past history is significant for no history of pneumonia or bronchitis.  Patient is non-smoker    3 most recent PHQ Screens 10/31/2019   Little interest or pleasure in doing things Not at all   Feeling down, depressed, irritable, or hopeless Not at all   Total Score PHQ 2 0       Patient Active Problem List   Diagnosis Code    Supraventricular tachycardia ([facility] [address] I47.1    Gastritis, chronic K29.50    Insomnia G47.00    Hypercholesterolemia E78.00    Neck pain M54.2    Anxiety F41.9    Osteopenia M85.80     Past Medical History:   Diagnosis Date    Arrhythmia     SVT    Chronic erosive gastritis     Menopause 1983    Neuropathy     Osteopenia     Postmenopausal HRT (hormone replacement therapy) 1983    Stopped in 7/2018    SCC (squamous cell carcinoma), arm 2014    left upper arm     Past Surgical History:   Procedure Laterality Date    ENDOSCOPY, COLON, DIAGNOSTIC      HX BREAST AUGMENTATION      HX HYSTERECTOMY  1983    HX OOPHORECTOMY Bilateral 1983    IMPLANT BREAST SILICONE/EQ Bilateral 6974     Social History     Socioeconomic History    Marital status:      Spouse name: Not on file    Number of children: Not on file    Years of education: Not on file    Highest education level: Not on file   Tobacco Use    Smoking status: Never Smoker    Smokeless tobacco: Never Used   Substance and Sexual Activity    Alcohol use: No     Family History   Problem Relation Age of Onset    Cancer Father         Lung Cancer    Alcohol abuse Mother         hepatic cirrhosis    Alcohol abuse Sister     Stroke Paternal Uncle      Allergies   Allergen Reactions    Zanaflex [Tizanidine] Other (comments)     Tired and head \"felt like it full of cotton. \"     Current Outpatient Medications   Medication Sig Dispense Refill    estradiol (ESTRACE) 0.5 mg tablet estradiol 0.5 mg tablet   1 po daily      pravastatin (PRAVACHOL) 20 mg tablet TAKE 1 TABLET BY MOUTH AT BEDTIME 90 Tab 2    meloxicam (MOBIC) 15 mg tablet TAKE 1 TABLET BY MOUTH EVERY DAY AS NEEDED 30 Tab 6    metoprolol succinate (TOPROL-XL) 25 mg XL tablet TAKE 1/2 TABLET DAILY 45 Tab 3    zolpidem (AMBIEN) 5 mg tablet Take 1 Tab by mouth nightly as needed for Sleep. Max Daily Amount: 5 mg. 30 Tab 2    acetaminophen (TYLENOL ARTHRITIS PAIN) 650 mg CR tablet Take 650 mg by mouth daily.       PREMARIN 0.625 mg/gram vaginal cream Use when needs vaginal exam  1               Review of Systems   Constitutional: Negative for malaise/fatigue and weight loss. Gastrointestinal: Negative for constipation, diarrhea and heartburn. Musculoskeletal: Negative for back pain and joint pain. Neurological: Negative for dizziness, tingling and focal weakness. Visit Vitals  /79 (BP 1 Location: Left arm, BP Patient Position: Sitting)   Pulse (!) 58   Temp 97.6 °F (36.4 °C) (Oral)   Resp 12   Ht 5' 1\" (1.549 m)   Wt 114 lb (51.7 kg)   SpO2 98%   BMI 21.54 kg/m²     Physical Exam   Constitutional: She is oriented to person, place, and time. She appears well-developed and well-nourished. HENT:   Head: Normocephalic and atraumatic. Right Ear: Tympanic membrane and ear canal normal.   Left Ear: Tympanic membrane and ear canal normal.   Nose: Mucosal edema present. Mouth/Throat: Oropharynx is clear and moist and mucous membranes are normal. Mucous membranes are not pale and not dry. No oropharyngeal exudate, posterior oropharyngeal edema or posterior oropharyngeal erythema. Eyes: Pupils are equal, round, and reactive to light. Conjunctivae are normal. Right eye exhibits no discharge. Left eye exhibits no discharge. Neck: Neck supple. Carotid bruit is not present. No thyromegaly present. Cardiovascular: Normal rate, regular rhythm, S1 normal, S2 normal and normal heart sounds. PMI is not displaced. Exam reveals no gallop. No murmur heard. Pulses:       Dorsalis pedis pulses are 2+ on the right side, and 2+ on the left side. Posterior tibial pulses are 2+ on the right side, and 2+ on the left side. Pulmonary/Chest: Effort normal and breath sounds normal. She has no wheezes. She has no rhonchi. She has no rales. Abdominal: Soft. Normal appearance. She exhibits no abdominal bruit and no mass. There is no hepatosplenomegaly. There is no tenderness. Musculoskeletal: She exhibits no edema. Lymphadenopathy:     She has no cervical adenopathy. Right: No supraclavicular adenopathy present. Left: No supraclavicular adenopathy present. Neurological: She is alert and oriented to person, place, and time. No sensory deficit. Skin: Skin is warm, dry and intact. No rash noted. Psychiatric: She has a normal mood and affect. Her behavior is normal.   Nursing note and vitals reviewed. Sensory foot exam:     Left Foot:   Visual Exam: normal    Pulse DP: 2+ (normal)   Filament test: 6/6   Vibratory sensation: diminished      Right Foot:   Visual Exam: normal    Pulse DP: 2+ (normal)   Filament test: 6/6   Vibratory sensation: diminished      Results for orders placed or performed in visit on 11/02/18   LIPID PANEL   Result Value Ref Range    Cholesterol, total 199 100 - 199 mg/dL    Triglyceride 56 0 - 149 mg/dL    HDL Cholesterol 88 >39 mg/dL    VLDL, calculated 11 5 - 40 mg/dL    LDL, calculated 100 (H) 0 - 99 mg/dL   METABOLIC PANEL, BASIC   Result Value Ref Range    Glucose 88 65 - 99 mg/dL    BUN 18 8 - 27 mg/dL    Creatinine 0.90 0.57 - 1.00 mg/dL    GFR est non-AA 63 >59 mL/min/1.73    GFR est AA 72 >59 mL/min/1.73    BUN/Creatinine ratio 20 12 - 28    Sodium 142 134 - 144 mmol/L    Potassium 4.5 3.5 - 5.2 mmol/L    Chloride 105 96 - 106 mmol/L    CO2 25 20 - 29 mmol/L    Calcium 9.4 8.7 - 10.3 mg/dL       ASSESSMENT and PLAN    ICD-10-CM ICD-9-CM    1. Physical exam, annual Z00.00 V70.0    2. Hypercholesterolemia E78.00 272.0 LIPID PANEL      METABOLIC PANEL, COMPREHENSIVE   3. Supraventricular tachycardia (HCC) I47.1 427.89    4. Anxiety F41.9 300.00    5. Insomnia due to other mental disorder F51.05 300.9     F99 327.02    6. Advance directive discussed with patient Z71.89 V65.49    7. Idiopathic peripheral neuropathy G60.9 356.9 TSH 3RD GENERATION      HEMOGLOBIN A1C WITH EAG      VITAMIN B12   8. Screening for depression Z13.31 V79.0 BEHAV ASSMT W/SCORE & DOCD/STAND INSTRUMENT   9. Tingling of both feet R20.2 782.0 MAGNESIUM   10. Subacute sinusitis, unspecified location J01.90 461.9 amoxicillin-clavulanate (AUGMENTIN) 875-125 mg per tablet     Diagnoses and all orders for this visit:    1. Physical exam, annual    2. Hypercholesterolemia  Hyperlipidemia is controlled  -     LIPID PANEL  -     METABOLIC PANEL, COMPREHENSIVE    3. Supraventricular tachycardia ([address]  Contolled. 4. Anxiety  Improved and managing witout mediation. 5. Insomnia due to other mental disorder  Improved. 6. Advance directive discussed with patient    7. Idiopathic peripheral neuropathy  -     TSH 3RD GENERATION  -     HEMOGLOBIN A1C WITH EAG  -     VITAMIN B12    8. Screening for depression=Negative  -     BEHAV ASSMT W/SCORE & DOCD/STAND INSTRUMENT    9. Tingling of both feet  Probable neuropathy. Offered neuro referral which she declined. -     MAGNESIUM    10. Subacute sinusitis, unspecified location  -     amoxicillin-clavulanate (AUGMENTIN) 875-125 mg per tablet; Take 1 Tab by mouth every twelve (12) hours. Follow-up and Dispositions    · Return in about 1 year (around 10/31/2020) for Physical .       lab results and schedule of future lab studies reviewed with patient  reviewed diet, exercise and weight control  I have discussed the diagnosis, evaluation and treatment options and the intended plan with the patient. Patient understands and is in agreement. The patient has received an after-visit summary and questions were answered concerning future plans. I have discussed side effects and warnings of any new medications with the patient as well.

## 2019-10-31 ENCOUNTER — OFFICE VISIT (OUTPATIENT)
Dept: INTERNAL MEDICINE CLINIC | Facility: CLINIC | Age: 79
End: 2019-10-31

## 2019-10-31 VITALS
SYSTOLIC BLOOD PRESSURE: 148 MMHG | HEIGHT: 61 IN | DIASTOLIC BLOOD PRESSURE: 79 MMHG | TEMPERATURE: 97.6 F | WEIGHT: 114 LBS | BODY MASS INDEX: 21.52 KG/M2 | RESPIRATION RATE: 12 BRPM | HEART RATE: 58 BPM | OXYGEN SATURATION: 98 %

## 2019-10-31 DIAGNOSIS — J01.90 SUBACUTE SINUSITIS, UNSPECIFIED LOCATION: ICD-10-CM

## 2019-10-31 DIAGNOSIS — R20.2 TINGLING OF BOTH FEET: ICD-10-CM

## 2019-10-31 DIAGNOSIS — F99 INSOMNIA DUE TO OTHER MENTAL DISORDER: ICD-10-CM

## 2019-10-31 DIAGNOSIS — G60.9 IDIOPATHIC PERIPHERAL NEUROPATHY: ICD-10-CM

## 2019-10-31 DIAGNOSIS — E78.00 HYPERCHOLESTEROLEMIA: ICD-10-CM

## 2019-10-31 DIAGNOSIS — Z13.31 SCREENING FOR DEPRESSION: ICD-10-CM

## 2019-10-31 DIAGNOSIS — Z71.89 ADVANCE DIRECTIVE DISCUSSED WITH PATIENT: ICD-10-CM

## 2019-10-31 DIAGNOSIS — I47.1 SUPRAVENTRICULAR TACHYCARDIA (HCC): ICD-10-CM

## 2019-10-31 DIAGNOSIS — Z00.00 PHYSICAL EXAM, ANNUAL: Primary | ICD-10-CM

## 2019-10-31 DIAGNOSIS — F51.05 INSOMNIA DUE TO OTHER MENTAL DISORDER: ICD-10-CM

## 2019-10-31 DIAGNOSIS — F41.9 ANXIETY: ICD-10-CM

## 2019-10-31 RX ORDER — ESTRADIOL 0.5 MG/1
TABLET ORAL
COMMUNITY

## 2019-10-31 RX ORDER — AMOXICILLIN AND CLAVULANATE POTASSIUM 875; 125 MG/1; MG/1
1 TABLET, FILM COATED ORAL EVERY 12 HOURS
Qty: 14 TAB | Refills: 0 | Status: SHIPPED | OUTPATIENT
Start: 2019-10-31 | End: 2020-11-30

## 2019-10-31 NOTE — PATIENT INSTRUCTIONS
Flonase or Nasacort, 2 sprays in both nostrils once a day. Augmentin 875/125 mg twice a day with food for 7 days If you are not better, suggest seeing ENT.

## 2019-10-31 NOTE — PROGRESS NOTES
Dajuan Dick  Identified pt with two pt identifiers(name and ). Chief Complaint   Patient presents with    Cholesterol Problem    Annual Wellness Visit       Reviewed record In preparation for visit and have obtained necessary documentation. Advanced directive / living will on file. 1. Have you been to the ER, urgent care clinic or hospitalized since your last visit? [facility] 1 month ago    2. Have you seen or consulted any other health care providers outside of the [address] [place] [address] since your last visit? Include any pap smears or colon screening. [address], mammogram    Vitals reviewed with provider. Health Maintenance reviewed:     Health Maintenance Due   Topic    GLAUCOMA SCREENING Q2Y         Wt Readings from Last 3 Encounters:   10/30/18 108 lb 8 oz (49.2 kg)   10/30/17 109 lb (49.4 kg)   16 105 lb (47.6 kg)      Temp Readings from Last 3 Encounters:   10/30/18 97.5 °F (36.4 °C) (Oral)   10/30/17 96.4 °F (35.8 °C) (Oral)   16 97.1 °F (36.2 °C) (Oral)      BP Readings from Last 3 Encounters:   10/30/18 138/70   10/30/17 129/62   16 122/74      Pulse Readings from Last 3 Encounters:   10/30/18 68   10/30/17 (!) 57   16 (!) 50      There were no vitals filed for this visit. Learning Assessment:   :     Learning Assessment 2015   PRIMARY LEARNER Patient Patient   BARRIERS PRIMARY LEARNER - NONE   CO-LEARNER CAREGIVER - No   PRIMARY LANGUAGE ENGLISH ENGLISH    NEED - No   LEARNER PREFERENCE PRIMARY READING DEMONSTRATION   ANSWERED BY patient patient   RELATIONSHIP SELF SELF        Depression Screening:   :     3 most recent PHQ Screens 10/30/2018   Little interest or pleasure in doing things Not at all   Feeling down, depressed, irritable, or hopeless Not at all   Total Score PHQ 2 0        Fall Risk Assessment:   :     Fall Risk Assessment, last 12 mths 10/30/2018   Able to walk? Yes   Fall in past 12 months?  No Abuse Screening:   :     Abuse Screening Questionnaire 10/30/2018 10/30/2017 7/14/2014   Do you ever feel afraid of your partner? N N N   Are you in a relationship with someone who physically or mentally threatens you? N N N   Is it safe for you to go home?  Y Y Y        ADL Screening:   :     ADL Assessment 3/9/2015   Feeding yourself No Help Needed   Getting from bed to chair No Help Needed   Getting dressed No Help Needed   Bathing or showering No Help Needed   Walk across the room (includes cane/walker) No Help Needed   Using the telphone No Help Needed   Taking your medications No Help Needed   Preparing meals No Help Needed   Managing money (expenses/bills) No Help Needed   Moderately strenuous housework (laundry) No Help Needed   Shopping for personal items (toiletries/medicines) No Help Needed   Shopping for groceries No Help Needed   Driving No Help Needed   Climbing a flight of stairs No Help Needed   Getting to places beyond walking distances No Help Needed

## 2019-11-01 LAB
EST. AVERAGE GLUCOSE BLD GHB EST-MCNC: 114 MG/DL
HBA1C MFR BLD: 5.6 % (ref 4.8–5.6)
MAGNESIUM SERPL-MCNC: 2.3 MG/DL (ref 1.6–2.3)
TSH SERPL DL<=0.005 MIU/L-ACNC: 1.96 UIU/ML (ref 0.45–4.5)
VIT B12 SERPL-MCNC: 720 PG/ML (ref 232–1245)

## 2019-11-05 ENCOUNTER — DOCUMENTATION ONLY (OUTPATIENT)
Dept: INTERNAL MEDICINE CLINIC | Facility: CLINIC | Age: 79
End: 2019-11-05

## 2019-11-17 DIAGNOSIS — M54.2 NECK PAIN: ICD-10-CM

## 2019-11-17 RX ORDER — MELOXICAM 15 MG/1
TABLET ORAL
Qty: 30 TAB | Refills: 0 | Status: SHIPPED | OUTPATIENT
Start: 2019-11-17 | End: 2019-12-17 | Stop reason: SDUPTHER

## 2019-12-17 DIAGNOSIS — M54.2 NECK PAIN: ICD-10-CM

## 2019-12-17 RX ORDER — MELOXICAM 15 MG/1
TABLET ORAL
Qty: 30 TAB | Refills: 0 | Status: SHIPPED | OUTPATIENT
Start: 2019-12-17 | End: 2020-01-16

## 2019-12-17 NOTE — TELEPHONE ENCOUNTER
[facility] pharmacy on file submitted a fax requesting a refill of   Requested Prescriptions     Pending Prescriptions Disp Refills    meloxicam (MOBIC) 15 mg tablet 30 Tab 0     Sig: TAKE 1 TABLET BY MOUTH ONCE DAILY AS NEEDED

## 2019-12-17 NOTE — TELEPHONE ENCOUNTER
PCP: Zainab Stockton MD     Last appt: 10/31/2019   Future Appointments   Date Time Provider Sharon Calzada   11/2/2020 10:00 AM Joselyn Buckley  W. [facility]        Requested Prescriptions     Pending Prescriptions Disp Refills    meloxicam (MOBIC) 15 mg tablet 30 Tab 0     Sig: TAKE 1 TABLET BY MOUTH ONCE DAILY AS NEEDED

## 2020-01-27 ENCOUNTER — HOSPITAL ENCOUNTER (EMERGENCY)
Age: 80
Discharge: HOME | End: 2020-01-27
Attending: EMERGENCY MEDICINE
Payer: COMMERCIAL

## 2020-01-27 VITALS
RESPIRATION RATE: 18 BRPM | WEIGHT: 113.54 LBS | HEART RATE: 77 BPM | OXYGEN SATURATION: 96 % | DIASTOLIC BLOOD PRESSURE: 49 MMHG | TEMPERATURE: 99 F | HEIGHT: 61 IN | SYSTOLIC BLOOD PRESSURE: 94 MMHG | BODY MASS INDEX: 21.44 KG/M2

## 2020-01-27 DIAGNOSIS — K52.9 GASTROENTERITIS, ACUTE: Primary | ICD-10-CM

## 2020-01-27 LAB
ALBUMIN SERPL-MCNC: 3.3 G/DL (ref 3.5–5)
ALBUMIN/GLOB SERPL: 0.9 {RATIO} (ref 1.1–2.2)
ALP SERPL-CCNC: 60 U/L (ref 45–117)
ALT SERPL-CCNC: 24 U/L (ref 12–78)
ANION GAP SERPL CALC-SCNC: 7 MMOL/L (ref 5–15)
APPEARANCE UR: ABNORMAL
AST SERPL-CCNC: 34 U/L (ref 15–37)
BACTERIA URNS QL MICRO: ABNORMAL /HPF
BASOPHILS # BLD: 0 K/UL (ref 0–0.1)
BASOPHILS NFR BLD: 0 % (ref 0–1)
BILIRUB SERPL-MCNC: 0.5 MG/DL (ref 0.2–1)
BILIRUB UR QL CFM: NEGATIVE
BUN SERPL-MCNC: 26 MG/DL (ref 6–20)
BUN/CREAT SERPL: 23 (ref 12–20)
CALCIUM SERPL-MCNC: 8.5 MG/DL (ref 8.5–10.1)
CHLORIDE SERPL-SCNC: 105 MMOL/L (ref 97–108)
CO2 SERPL-SCNC: 25 MMOL/L (ref 21–32)
COLOR UR: ABNORMAL
CREAT SERPL-MCNC: 1.12 MG/DL (ref 0.55–1.02)
DIFFERENTIAL METHOD BLD: ABNORMAL
EOSINOPHIL # BLD: 0 K/UL (ref 0–0.4)
EOSINOPHIL NFR BLD: 0 % (ref 0–7)
EPITH CASTS URNS QL MICRO: ABNORMAL /LPF
ERYTHROCYTE [DISTWIDTH] IN BLOOD BY AUTOMATED COUNT: 13.3 % (ref 11.5–14.5)
GLOBULIN SER CALC-MCNC: 3.6 G/DL (ref 2–4)
GLUCOSE SERPL-MCNC: 147 MG/DL (ref 65–100)
GLUCOSE UR STRIP.AUTO-MCNC: 100 MG/DL
HCT VFR BLD AUTO: 38.3 % (ref 35–47)
HGB BLD-MCNC: 12.4 G/DL (ref 11.5–16)
HGB UR QL STRIP: NEGATIVE
IMM GRANULOCYTES # BLD AUTO: 0 K/UL (ref 0–0.04)
IMM GRANULOCYTES NFR BLD AUTO: 0 % (ref 0–0.5)
KETONES UR QL STRIP.AUTO: 15 MG/DL
LEUKOCYTE ESTERASE UR QL STRIP.AUTO: NEGATIVE
LYMPHOCYTES # BLD: 0.3 K/UL (ref 0.8–3.5)
LYMPHOCYTES NFR BLD: 4 % (ref 12–49)
MCH RBC QN AUTO: 29.3 PG (ref 26–34)
MCHC RBC AUTO-ENTMCNC: 32.4 G/DL (ref 30–36.5)
MCV RBC AUTO: 90.5 FL (ref 80–99)
MONOCYTES # BLD: 0.3 K/UL (ref 0–1)
MONOCYTES NFR BLD: 3 % (ref 5–13)
MUCOUS THREADS URNS QL MICRO: ABNORMAL /LPF
NEUTS SEG # BLD: 7.7 K/UL (ref 1.8–8)
NEUTS SEG NFR BLD: 93 % (ref 32–75)
NITRITE UR QL STRIP.AUTO: NEGATIVE
NRBC # BLD: 0 K/UL (ref 0–0.01)
NRBC BLD-RTO: 0 PER 100 WBC
PH UR STRIP: 6 [PH] (ref 5–8)
PLATELET # BLD AUTO: 275 K/UL (ref 150–400)
PMV BLD AUTO: 9.4 FL (ref 8.9–12.9)
POTASSIUM SERPL-SCNC: 3.8 MMOL/L (ref 3.5–5.1)
PROT SERPL-MCNC: 6.9 G/DL (ref 6.4–8.2)
PROT UR STRIP-MCNC: ABNORMAL MG/DL
RBC # BLD AUTO: 4.23 M/UL (ref 3.8–5.2)
RBC #/AREA URNS HPF: ABNORMAL /HPF (ref 0–5)
RBC MORPH BLD: ABNORMAL
SODIUM SERPL-SCNC: 137 MMOL/L (ref 136–145)
SP GR UR REFRACTOMETRY: 1.03 (ref 1–1.03)
UA: UC IF INDICATED,UAUC: ABNORMAL
UROBILINOGEN UR QL STRIP.AUTO: 0.2 EU/DL (ref 0.2–1)
WBC # BLD AUTO: 8.3 K/UL (ref 3.6–11)
WBC URNS QL MICRO: ABNORMAL /HPF (ref 0–4)

## 2020-01-27 PROCEDURE — 99284 EMERGENCY DEPT VISIT MOD MDM: CPT

## 2020-01-27 PROCEDURE — 74011250636 HC RX REV CODE- 250/636: Performed by: EMERGENCY MEDICINE

## 2020-01-27 PROCEDURE — 85025 COMPLETE CBC W/AUTO DIFF WBC: CPT

## 2020-01-27 PROCEDURE — 36415 COLL VENOUS BLD VENIPUNCTURE: CPT

## 2020-01-27 PROCEDURE — 87086 URINE CULTURE/COLONY COUNT: CPT

## 2020-01-27 PROCEDURE — 81001 URINALYSIS AUTO W/SCOPE: CPT

## 2020-01-27 PROCEDURE — 96361 HYDRATE IV INFUSION ADD-ON: CPT

## 2020-01-27 PROCEDURE — 96374 THER/PROPH/DIAG INJ IV PUSH: CPT

## 2020-01-27 PROCEDURE — 80053 COMPREHEN METABOLIC PANEL: CPT

## 2020-01-27 RX ORDER — ONDANSETRON 2 MG/ML
4 INJECTION INTRAMUSCULAR; INTRAVENOUS
Status: COMPLETED | OUTPATIENT
Start: 2020-01-27 | End: 2020-01-27

## 2020-01-27 RX ORDER — ONDANSETRON 4 MG/1
4 TABLET, ORALLY DISINTEGRATING ORAL
Qty: 10 TAB | Refills: 0 | Status: SHIPPED | OUTPATIENT
Start: 2020-01-27 | End: 2021-12-01

## 2020-01-27 RX ADMIN — SODIUM CHLORIDE 1000 ML: 900 INJECTION, SOLUTION INTRAVENOUS at 19:10

## 2020-01-27 RX ADMIN — ONDANSETRON 4 MG: 2 INJECTION INTRAMUSCULAR; INTRAVENOUS at 19:10

## 2020-01-27 NOTE — ED NOTES
Pt arrives to the ED AAOX4 with a c/c of vomiting and diarrhea onset last night. Pt is noted in stable condition, now in ED room with side rail up, bed to lowest position and call light within reach. Will continue to monitor and wait for EDP evaluation.

## 2020-01-27 NOTE — ED PROVIDER NOTES
EMERGENCY DEPARTMENT HISTORY AND PHYSICAL EXAM      Date: 1/27/2020  Patient Name: Zina Nieves    History of Presenting Illness     Chief Complaint   Patient presents with    Vomiting     patient reports vomiting, diarrhea, and subjective fever since last night. reports sick family members       History Provided By: Patient    HPI: Zina Nieves, 70 y.o. female  presents to the ED with cc of nausea vomiting and diarrhea. Patient states symptoms started about 11:30 PM last night. She has not had any abdominal pain. No chest pain or shortness of breath. She has been feeling lightheaded and fatigued. No headache. No fevers or chills. No difficulty urinating. She has had sick contacts in her family with similar symptoms. She took Imodium prior to arrival for her diarrhea. There are no other complaints, changes, or physical findings at this time. PCP: Johnny Washington MD    No current facility-administered medications on file prior to encounter. Current Outpatient Medications on File Prior to Encounter   Medication Sig Dispense Refill    meloxicam (MOBIC) 15 mg tablet TAKE 1 TABLET BY MOUTH EVERY DAY AS NEEDED 30 Tab 2    estradiol (ESTRACE) 0.5 mg tablet estradiol 0.5 mg tablet   1 po daily      amoxicillin-clavulanate (AUGMENTIN) 875-125 mg per tablet Take 1 Tab by mouth every twelve (12) hours. 14 Tab 0    pravastatin (PRAVACHOL) 20 mg tablet TAKE 1 TABLET BY MOUTH AT BEDTIME 90 Tab 2    metoprolol succinate (TOPROL-XL) 25 mg XL tablet TAKE 1/2 TABLET DAILY 45 Tab 3    zolpidem (AMBIEN) 5 mg tablet Take 1 Tab by mouth nightly as needed for Sleep. Max Daily Amount: 5 mg. 30 Tab 2    acetaminophen (TYLENOL ARTHRITIS PAIN) 650 mg CR tablet Take 650 mg by mouth daily.       PREMARIN 0.625 mg/gram vaginal cream Use when needs vaginal exam  1       Past History     Past Medical History:  Past Medical History:   Diagnosis Date    Arrhythmia     SVT    Chronic erosive gastritis     Menopause 1983    Neuropathy     Osteopenia     Postmenopausal HRT (hormone replacement therapy) 1983    Stopped in 7/2018    SCC (squamous cell carcinoma), arm 2014    left upper arm       Past Surgical History:  Past Surgical History:   Procedure Laterality Date    ENDOSCOPY, COLON, DIAGNOSTIC      HX BREAST AUGMENTATION      HX HYSTERECTOMY  1983    HX OOPHORECTOMY Bilateral 1983    IMPLANT BREAST SILICONE/EQ Bilateral 1398       Family History:  Family History   Problem Relation Age of Onset    Cancer Father         Lung Cancer    Alcohol abuse Mother         hepatic cirrhosis    Alcohol abuse Sister     Stroke Paternal Uncle        Social History:  Social History     Tobacco Use    Smoking status: Never Smoker    Smokeless tobacco: Never Used   Substance Use Topics    Alcohol use: No    Drug use: Not on file       Allergies: Allergies   Allergen Reactions    Zanaflex [Tizanidine] Other (comments)     Patient denies allergy         Review of Systems   Review of Systems   Constitutional: Negative for chills and fever. HENT: Negative for congestion, ear pain, rhinorrhea, sore throat and trouble swallowing. Eyes: Negative for visual disturbance. Respiratory: Negative for cough, chest tightness and shortness of breath. Cardiovascular: Negative for chest pain and palpitations. Gastrointestinal: Positive for diarrhea, nausea and vomiting. Negative for abdominal pain, blood in stool and constipation. Genitourinary: Negative for decreased urine volume, difficulty urinating, dysuria and frequency. Musculoskeletal: Negative for back pain and neck pain. Skin: Negative for color change and rash. Neurological: Positive for light-headedness. Negative for dizziness, weakness and headaches. Physical Exam   Physical Exam  Vitals signs and nursing note reviewed. Constitutional:       General: She is not in acute distress. Appearance: She is well-developed.  She is not ill-appearing. Eyes:      Conjunctiva/sclera: Conjunctivae normal.   Neck:      Musculoskeletal: Neck supple. Cardiovascular:      Rate and Rhythm: Normal rate and regular rhythm. Pulmonary:      Effort: Pulmonary effort is normal. No accessory muscle usage or respiratory distress. Breath sounds: Normal breath sounds. Abdominal:      General: There is no distension. Palpations: Abdomen is soft. Tenderness: There is no abdominal tenderness. Lymphadenopathy:      Cervical: No cervical adenopathy. Skin:     General: Skin is warm and dry. Neurological:      Mental Status: She is alert and oriented to person, place, and time. Cranial Nerves: No cranial nerve deficit. Sensory: No sensory deficit.          Diagnostic Study Results     Labs -     Recent Results (from the past 24 hour(s))   URINALYSIS W/ REFLEX CULTURE    Collection Time: 01/27/20  5:21 PM   Result Value Ref Range    Color DARK YELLOW      Appearance CLOUDY (A) CLEAR      Specific gravity 1.028 1.003 - 1.030      pH (UA) 6.0 5.0 - 8.0      Protein TRACE (A) NEG mg/dL    Glucose 100 (A) NEG mg/dL    Ketone 15 (A) NEG mg/dL    Blood NEGATIVE  NEG      Urobilinogen 0.2 0.2 - 1.0 EU/dL    Nitrites NEGATIVE  NEG      Leukocyte Esterase NEGATIVE  NEG      WBC 0-4 0 - 4 /hpf    RBC 0-5 0 - 5 /hpf    Epithelial cells MODERATE (A) FEW /lpf    Bacteria 1+ (A) NEG /hpf    UA:UC IF INDICATED URINE CULTURE ORDERED (A) CNI      Mucus TRACE (A) NEG /lpf   BILIRUBIN, CONFIRM    Collection Time: 01/27/20  5:21 PM   Result Value Ref Range    Bilirubin UA, confirm NEGATIVE  NEG     CBC WITH AUTOMATED DIFF    Collection Time: 01/27/20  5:22 PM   Result Value Ref Range    WBC 8.3 3.6 - 11.0 K/uL    RBC 4.23 3.80 - 5.20 M/uL    HGB 12.4 11.5 - 16.0 g/dL    HCT 38.3 35.0 - 47.0 %    MCV 90.5 80.0 - 99.0 FL    MCH 29.3 26.0 - 34.0 PG    MCHC 32.4 30.0 - 36.5 g/dL    RDW 13.3 11.5 - 14.5 %    PLATELET 152 719 - 841 K/uL    MPV 9.4 8.9 - 12.9 FL    NRBC 0.0 0  WBC    ABSOLUTE NRBC 0.00 0.00 - 0.01 K/uL    NEUTROPHILS 93 (H) 32 - 75 %    LYMPHOCYTES 4 (L) 12 - 49 %    MONOCYTES 3 (L) 5 - 13 %    EOSINOPHILS 0 0 - 7 %    BASOPHILS 0 0 - 1 %    IMMATURE GRANULOCYTES 0 0.0 - 0.5 %    ABS. NEUTROPHILS 7.7 1.8 - 8.0 K/UL    ABS. LYMPHOCYTES 0.3 (L) 0.8 - 3.5 K/UL    ABS. MONOCYTES 0.3 0.0 - 1.0 K/UL    ABS. EOSINOPHILS 0.0 0.0 - 0.4 K/UL    ABS. BASOPHILS 0.0 0.0 - 0.1 K/UL    ABS. IMM. GRANS. 0.0 0.00 - 0.04 K/UL    DF AUTOMATED      RBC COMMENTS NORMOCYTIC, NORMOCHROMIC     METABOLIC PANEL, COMPREHENSIVE    Collection Time: 01/27/20  5:22 PM   Result Value Ref Range    Sodium 137 136 - 145 mmol/L    Potassium 3.8 3.5 - 5.1 mmol/L    Chloride 105 97 - 108 mmol/L    CO2 25 21 - 32 mmol/L    Anion gap 7 5 - 15 mmol/L    Glucose 147 (H) 65 - 100 mg/dL    BUN 26 (H) 6 - 20 MG/DL    Creatinine 1.12 (H) 0.55 - 1.02 MG/DL    BUN/Creatinine ratio 23 (H) 12 - 20      GFR est AA 57 (L) >60 ml/min/1.73m2    GFR est non-AA 47 (L) >60 ml/min/1.73m2    Calcium 8.5 8.5 - 10.1 MG/DL    Bilirubin, total 0.5 0.2 - 1.0 MG/DL    ALT (SGPT) 24 12 - 78 U/L    AST (SGOT) 34 15 - 37 U/L    Alk. phosphatase 60 45 - 117 U/L    Protein, total 6.9 6.4 - 8.2 g/dL    Albumin 3.3 (L) 3.5 - 5.0 g/dL    Globulin 3.6 2.0 - 4.0 g/dL    A-G Ratio 0.9 (L) 1.1 - 2.2         Radiologic Studies -   No orders to display     CT Results  (Last 48 hours)    None        CXR Results  (Last 48 hours)    None            Medical Decision Making   I am the first provider for this patient. I reviewed the vital signs, available nursing notes, past medical history, past surgical history, family history and social history. Vital Signs-Reviewed the patient's vital signs.   Patient Vitals for the past 24 hrs:   Temp Pulse Resp BP SpO2   01/27/20 1641 98.5 °F (36.9 °C) 80 18 109/48 100 %         Records Reviewed: Nursing Notes and Old Medical Records    Provider Notes (Medical Decision Making): Patient presents to the ER with gastroenteritis symptoms including nausea vomiting and diarrhea. She has no abdominal pain she has a benign abdominal exam.  She has had sick contacts with grandchildren. Improved with IV fluids and antiemetics. Will prescribe Zofran for use at home. Clear liquid diet for least 24 hours. Imodium will be helpful for the diarrhea. ED Course:   Initial assessment performed. The patients presenting problems have been discussed, and they are in agreement with the care plan formulated and outlined with them. I have encouraged them to ask questions as they arise throughout their visit. Orders Placed This Encounter    CULTURE, URINE    CBC WITH AUTOMATED DIFF    METABOLIC PANEL, COMPREHENSIVE    URINALYSIS W/ REFLEX CULTURE    BILIRUBIN, CONFIRM    sodium chloride 0.9 % bolus infusion 1,000 mL    ondansetron (ZOFRAN) injection 4 mg    ondansetron (ZOFRAN ODT) 4 mg disintegrating tablet       Procedures      Critical Care Time:   0    Disposition:  Discharge  8:33 PM    The patient's emergency department evaluation is now complete. I have reviewed all labs, imaging, and pertinent information. I have discussed all results with the patient and/or family. Based on our evaluation today I do believe that the patient is safe to be discharged home. The patient has been provided with at home instructions that are pertinent to their complaint today, although these may not be specific to the exact diagnosis. I have reviewed the patient's home medications and attempted to reconcile if not already done so by pharmacy or nursing staff. I have discussed all new prescriptions with the patient. The patient has been encouraged to follow-up with primary care doctor and/or specialist, and these have been discussed with the patient.   The patient has been advised that they may return to the emergency department if they have any worsening symptoms and or new symptoms that are of concern to them. Verbal discharge instructions may have also been provided to the patient that may not be specifically contained in the written discharge instructions. The patient has been given opportunity to ask questions prior to discharge. PLAN:  1. Current Discharge Medication List      START taking these medications    Details   ondansetron (ZOFRAN ODT) 4 mg disintegrating tablet Take 1 Tab by mouth every eight (8) hours as needed for Nausea. Qty: 10 Tab, Refills: 0           2. Follow-up Information     Follow up With Specialties Details Why Akbar Brooke MD Internal Medicine Schedule an appointment as soon as possible for a visit in 1 week  [address] [place] [address]  [phone]          Return to ED if worse     Diagnosis     Clinical Impression:   1. Gastroenteritis, acute            This note will not be viewable in MyChart.

## 2020-01-28 LAB
BACTERIA SPEC CULT: NORMAL
CC UR VC: NORMAL
SERVICE CMNT-IMP: NORMAL

## 2020-01-28 NOTE — ED NOTES
Pt discharged home with . Discharge instructions explained to pt. All questions answered. PIV removed. Escorted to exit via wheelchair.

## 2020-01-28 NOTE — ED NOTES
1930: Assumed care of patient from [address] Randi RN at this time. Pt on monitor x2.  at bedside. No further needs    2030: Patient reports that she is feeling better after medication. Remains in bed resting. No further needs at this time    2115: Dr. Chata Gilbert has reviewed discharge instructions with the patient. The patient verbalized understanding. Patient wheeled out of ED at this time.

## 2020-03-16 DIAGNOSIS — I47.1 SUPRAVENTRICULAR TACHYCARDIA (HCC): ICD-10-CM

## 2020-03-16 RX ORDER — METOPROLOL SUCCINATE 25 MG/1
TABLET, EXTENDED RELEASE ORAL
Qty: 45 TAB | Refills: 3 | Status: SHIPPED | OUTPATIENT
Start: 2020-03-16 | End: 2021-03-11 | Stop reason: SDUPTHER

## 2020-04-15 ENCOUNTER — TELEPHONE (OUTPATIENT)
Dept: INTERNAL MEDICINE CLINIC | Facility: CLINIC | Age: 80
End: 2020-04-15

## 2020-04-15 DIAGNOSIS — M54.2 NECK PAIN: ICD-10-CM

## 2020-04-15 RX ORDER — MELOXICAM 7.5 MG/1
7.5 TABLET ORAL DAILY
Qty: 30 TAB | Refills: 1 | Status: SHIPPED | OUTPATIENT
Start: 2020-04-15 | End: 2020-11-30

## 2020-04-15 RX ORDER — MELOXICAM 15 MG/1
TABLET ORAL
Qty: 30 TAB | Refills: 2 | OUTPATIENT
Start: 2020-04-15

## 2020-04-15 NOTE — TELEPHONE ENCOUNTER
Verified patients name and date of birth. Advised patient per Dr Bynum Harada note regarding meloxicam  7.5 mg and risks of taking the 15 mg strength. Patient states she rarely takes meloxicam 15 mg and currently has some at home and stated she will call pharmacy to say she does not need refill.

## 2020-04-15 NOTE — TELEPHONE ENCOUNTER
I am concerned about continued regular use of meloxicam due to increase risk of heart attack, kidney disease and intestinal bleeding. I am decreasing dose first to 7.5 mg and will see if she manages well with that before trying to discontinue. May supplement with Tylenol.

## 2020-08-10 ENCOUNTER — HOSPITAL ENCOUNTER (OUTPATIENT)
Dept: MRI IMAGING | Age: 80
Discharge: HOME | End: 2020-08-10
Payer: COMMERCIAL

## 2020-08-10 DIAGNOSIS — M46.1 SACROILIITIS, NOT ELSEWHERE CLASSIFIED (HCC): ICD-10-CM

## 2020-08-10 DIAGNOSIS — M51.36 DDD (DEGENERATIVE DISC DISEASE), LUMBAR: ICD-10-CM

## 2020-08-10 DIAGNOSIS — M47.816 OSTEOARTHRITIS OF LUMBAR SPINE, UNSPECIFIED SPINAL OSTEOARTHRITIS COMPLICATION STATUS: ICD-10-CM

## 2020-08-10 DIAGNOSIS — M54.31 SCIATICA OF RIGHT SIDE: ICD-10-CM

## 2020-08-10 DIAGNOSIS — M54.50 LOW BACK PAIN, UNSPECIFIED BACK PAIN LATERALITY, UNSPECIFIED CHRONICITY, UNSPECIFIED WHETHER SCIATICA PRESENT: ICD-10-CM

## 2020-08-10 PROCEDURE — 72148 MRI LUMBAR SPINE W/O DYE: CPT | Performed by: PHYSICAL MEDICINE & REHABILITATION

## 2020-11-23 ENCOUNTER — TELEPHONE (OUTPATIENT)
Dept: INTERNAL MEDICINE CLINIC | Age: 80
End: 2020-11-23

## 2020-11-23 DIAGNOSIS — E78.00 HYPERCHOLESTEROLEMIA: Primary | ICD-10-CM

## 2020-11-23 NOTE — TELEPHONE ENCOUNTER
----- Message from Paul Arevalo sent at 11/23/2020 12:22 PM EST -----  Regarding: FW: Dr. Annie Clarke, telephone  No lab orders to print  ----- Message -----  From: Kwame Scott  Sent: 11/23/2020  11:40 AM EST  To: [facility]  Subject: Dr. Annie Clarke, telephone                             Caller's first and last name: NA  Reason for call: Lab Order   Callback required yes/no and why: Y  Best contact number(s):[phone]  Details to clarify the request: Going to [facility], appt is 11/30 @ 2:30, patient was waiting for order to be mailed and has not seen it yet, can email the order to pt. at [email or web address]

## 2020-11-28 LAB
ALBUMIN SERPL-MCNC: 3.7 G/DL (ref 3.7–4.7)
ALBUMIN/GLOB SERPL: 1.4 {RATIO} (ref 1.2–2.2)
ALP SERPL-CCNC: 49 IU/L (ref 39–117)
ALT SERPL-CCNC: 13 IU/L (ref 0–32)
AST SERPL-CCNC: 23 IU/L (ref 0–40)
BILIRUB SERPL-MCNC: 0.4 MG/DL (ref 0–1.2)
BUN SERPL-MCNC: 14 MG/DL (ref 8–27)
BUN/CREAT SERPL: 15 (ref 12–28)
CALCIUM SERPL-MCNC: 9.2 MG/DL (ref 8.7–10.3)
CHLORIDE SERPL-SCNC: 107 MMOL/L (ref 96–106)
CHOLEST SERPL-MCNC: 201 MG/DL (ref 100–199)
CO2 SERPL-SCNC: 25 MMOL/L (ref 20–29)
CREAT SERPL-MCNC: 0.91 MG/DL (ref 0.57–1)
GLOBULIN SER CALC-MCNC: 2.6 G/DL (ref 1.5–4.5)
GLUCOSE SERPL-MCNC: 87 MG/DL (ref 65–99)
HDLC SERPL-MCNC: 93 MG/DL
LDLC SERPL CALC-MCNC: 96 MG/DL (ref 0–99)
POTASSIUM SERPL-SCNC: 4.6 MMOL/L (ref 3.5–5.2)
PROT SERPL-MCNC: 6.3 G/DL (ref 6–8.5)
SODIUM SERPL-SCNC: 142 MMOL/L (ref 134–144)
TRIGL SERPL-MCNC: 65 MG/DL (ref 0–149)
VLDLC SERPL CALC-MCNC: 12 MG/DL (ref 5–40)

## 2020-11-30 ENCOUNTER — OFFICE VISIT (OUTPATIENT)
Dept: INTERNAL MEDICINE CLINIC | Age: 80
End: 2020-11-30
Payer: COMMERCIAL

## 2020-11-30 VITALS
HEIGHT: 61 IN | WEIGHT: 114 LBS | HEART RATE: 56 BPM | DIASTOLIC BLOOD PRESSURE: 70 MMHG | SYSTOLIC BLOOD PRESSURE: 127 MMHG | OXYGEN SATURATION: 98 % | BODY MASS INDEX: 21.52 KG/M2 | RESPIRATION RATE: 16 BRPM | TEMPERATURE: 98.2 F

## 2020-11-30 DIAGNOSIS — Z00.00 WELL ADULT EXAM: Primary | ICD-10-CM

## 2020-11-30 DIAGNOSIS — E78.00 HYPERCHOLESTEROLEMIA: ICD-10-CM

## 2020-11-30 DIAGNOSIS — F99 INSOMNIA DUE TO OTHER MENTAL DISORDER: ICD-10-CM

## 2020-11-30 DIAGNOSIS — F51.05 INSOMNIA DUE TO OTHER MENTAL DISORDER: ICD-10-CM

## 2020-11-30 DIAGNOSIS — I47.1 SUPRAVENTRICULAR TACHYCARDIA (HCC): ICD-10-CM

## 2020-11-30 PROCEDURE — 99397 PER PM REEVAL EST PAT 65+ YR: CPT | Performed by: INTERNAL MEDICINE

## 2020-11-30 RX ORDER — ALPRAZOLAM 0.25 MG/1
0.25 TABLET ORAL
COMMUNITY
End: 2021-12-01

## 2020-11-30 NOTE — PROGRESS NOTES
HPI  Ms. Silvia Dewitt is a 68y.o. year old female, she is seen today for well exam. Has been well. Denies chest pain, shortness of breath, dizziness/lightheadedness, headaches, visual changes, edema. Doesn't check bp at home. No n/v/abd pain, melena or brbpr. Has gained a few pounds gradually. Rare use ambien - few times per year.  with dementia, needing more care, this is stressful. Has xanax to use prn - hasn't needed in a long time. Still sees gyn - Dr. Duey Boast. Walks 2 miles 3x per week. Chief Complaint   Patient presents with    Physical     Room 2B //         Prior to Admission medications    Medication Sig Start Date End Date Taking? Authorizing Provider   ergocalciferol, vitamin D2, (VITAMIN D2 PO) Take  by mouth. Yes Provider, Historical   ALPRAZolam (XANAX) 0.25 mg tablet Take 0.25 mg by mouth two (2) times daily as needed. Anxiety   Yes Provider, Historical   metoprolol succinate (TOPROL-XL) 25 mg XL tablet TAKE 1/2 TABLET BY MOUTH ONCE DAILY 3/16/20  Yes Fran Cantor MD   pravastatin (PRAVACHOL) 20 mg tablet TAKE 1 TABLET BY MOUTH AT BEDTIME 3/11/20  Yes Fran Cantor MD   ondansetron (ZOFRAN ODT) 4 mg disintegrating tablet Take 1 Tab by mouth every eight (8) hours as needed for Nausea. 1/27/20  Yes Dominique Fuller MD   estradiol (ESTRACE) 0.5 mg tablet estradiol 0.5 mg tablet   1 po daily   Yes Provider, Historical   zolpidem (AMBIEN) 5 mg tablet Take 1 Tab by mouth nightly as needed for Sleep. Max Daily Amount: 5 mg. 11/29/18  Yes Fran Cantor MD   PREMARIN 0.625 mg/gram vaginal cream Use when needs vaginal exam 9/14/15  Yes Provider, Historical   acetaminophen (TYLENOL ARTHRITIS PAIN) 650 mg CR tablet Take 650 mg by mouth daily.     Provider, Historical         Allergies   Allergen Reactions    Zanaflex [Tizanidine] Other (comments)     Patient denies allergy         REVIEW OF SYSTEMS:  Per HPI    PHYSICAL EXAM:  Visit Vitals  /70 (BP 1 Location: Left arm, BP Patient Position: Sitting)   Pulse (!) 56   Temp 98.2 °F (36.8 °C) (Oral)   Resp 16   Ht 5' 1\" (1.549 m)   Wt 114 lb (51.7 kg)   SpO2 98%   BMI 21.54 kg/m²     Constitutional: Appears well-developed and well-nourished. No distress. HENT:   Head: Normocephalic and atraumatic. Eyes: No scleral icterus. Cardiovascular: Normal S1/S2, regular rhythm. No murmurs, rubs, or gallops. Pulmonary/Chest: Effort normal and breath sounds normal. No respiratory distress. No wheezes, rhonchi, or rales. Ext: No edema. Neurological: Alert. Psychiatric: Normal mood and affect. Behavior is normal.     Lab Results   Component Value Date/Time    Sodium 142 11/27/2020 01:00 PM    Potassium 4.6 11/27/2020 01:00 PM    Chloride 107 (H) 11/27/2020 01:00 PM    CO2 25 11/27/2020 01:00 PM    Anion gap 7 01/27/2020 05:22 PM    Glucose 87 11/27/2020 01:00 PM    BUN 14 11/27/2020 01:00 PM    Creatinine 0.91 11/27/2020 01:00 PM    BUN/Creatinine ratio 15 11/27/2020 01:00 PM    GFR est AA 70 11/27/2020 01:00 PM    GFR est non-AA 61 11/27/2020 01:00 PM    Calcium 9.2 11/27/2020 01:00 PM    Bilirubin, total 0.4 11/27/2020 01:00 PM    Alk. phosphatase 49 11/27/2020 01:00 PM    Protein, total 6.3 11/27/2020 01:00 PM    Albumin 3.7 11/27/2020 01:00 PM    Globulin 3.6 01/27/2020 05:22 PM    A-G Ratio 1.4 11/27/2020 01:00 PM    ALT (SGPT) 13 11/27/2020 01:00 PM     Lab Results   Component Value Date/Time    Hemoglobin A1c 5.6 10/31/2019 11:30 AM      Lab Results   Component Value Date/Time    Cholesterol, total 201 (H) 11/27/2020 01:00 PM    HDL Cholesterol 93 11/27/2020 01:00 PM    LDL, calculated 100 (H) 11/02/2018 08:50 AM    LDL Chol Calc (NIH) 96 11/27/2020 01:00 PM    VLDL, calculated 11 11/02/2018 08:50 AM    VLDL Cholesterol Jese 12 11/27/2020 01:00 PM    Triglyceride 65 11/27/2020 01:00 PM          ASSESSMENT/PLAN  Diagnoses and all orders for this visit:    1. Well adult exam  Encouraged exercise    2. Supraventricular tachycardia (HCC)  On metoprolol  3. Hypercholesterolemia  At goal with pravastatin  4. Insomnia due to other mental disorder  Rare use ambien        Health Maintenance Due   Topic Date Due    GLAUCOMA SCREENING Q2Y  06/23/2016        Follow-up and Dispositions    · Return in about 1 year (around 11/30/2021) for well exam.            Reviewed plan of care. Patient has provided input and agrees with goals. The nurse provided the patient and/or family with advanced directive information if needed and encouraged the patient to provide a copy to the office when available.

## 2020-11-30 NOTE — PROGRESS NOTES
Lavelle Lobato  Identified pt with two pt identifiers(name and ). Chief Complaint   Patient presents with    Physical     Room 2B //        Reviewed record In preparation for visit and have obtained necessary documentation. 1. Have you been to the ER, urgent care clinic or hospitalized since your last visit? Yes. [facility]     2. Have you seen or consulted any other health care providers outside of the [address] [place] [address] since your last visit? Include any pap smears or colon screening. Yes. Adebayo Mcdermott and Dr. Horacio Robins     Patient has an advance directive. Vitals reviewed with provider.     Health Maintenance reviewed:     Health Maintenance Due   Topic    GLAUCOMA SCREENING Q2Y     Lipid Screen     Flu Vaccine (1)          Wt Readings from Last 3 Encounters:   20 114 lb (51.7 kg)   20 113 lb 8.6 oz (51.5 kg)   10/31/19 114 lb (51.7 kg)        Temp Readings from Last 3 Encounters:   20 98.2 °F (36.8 °C) (Oral)   20 99 °F (37.2 °C)   10/31/19 97.6 °F (36.4 °C) (Oral)        BP Readings from Last 3 Encounters:   20 127/70   20 94/49   10/31/19 148/79        Pulse Readings from Last 3 Encounters:   20 (!) 56   20 77   10/31/19 (!) 58        Vitals:    20 1426   BP: 127/70   Pulse: (!) 56   Resp: 16   Temp: 98.2 °F (36.8 °C)   TempSrc: Oral   SpO2: 98%   Weight: 114 lb (51.7 kg)   Height: 5' 1\" (1.549 m)   PainSc:   0 - No pain          Learning Assessment:   :       Learning Assessment 2015   PRIMARY LEARNER Patient Patient   BARRIERS PRIMARY LEARNER - NONE   CO-LEARNER CAREGIVER - No   PRIMARY LANGUAGE ENGLISH ENGLISH    NEED - No   LEARNER PREFERENCE PRIMARY READING DEMONSTRATION   ANSWERED BY patient patient   RELATIONSHIP SELF SELF        Depression Screening:   :       3 most recent PHQ Screens 2020   Little interest or pleasure in doing things Not at all   Feeling down, depressed, irritable, or hopeless Not at all   Total Score PHQ 2 0        Fall Risk Assessment:   :       Fall Risk Assessment, last 12 mths 11/30/2020   Able to walk? Yes   Fall in past 12 months? No        Abuse Screening:   :       Abuse Screening Questionnaire 11/30/2020 10/31/2019 10/30/2018 10/30/2017 7/14/2014   Do you ever feel afraid of your partner? N N N N N   Are you in a relationship with someone who physically or mentally threatens you? N N N N N   Is it safe for you to go home?  Y Y Y Y Y        ADL Screening:   :       ADL Assessment 3/9/2015   Feeding yourself No Help Needed   Getting from bed to chair No Help Needed   Getting dressed No Help Needed   Bathing or showering No Help Needed   Walk across the room (includes cane/walker) No Help Needed   Using the telphone No Help Needed   Taking your medications No Help Needed   Preparing meals No Help Needed   Managing money (expenses/bills) No Help Needed   Moderately strenuous housework (laundry) No Help Needed   Shopping for personal items (toiletries/medicines) No Help Needed   Shopping for groceries No Help Needed   Driving No Help Needed   Climbing a flight of stairs No Help Needed   Getting to places beyond walking distances No Help Needed      \

## 2021-03-11 DIAGNOSIS — E78.00 HYPERCHOLESTEROLEMIA: ICD-10-CM

## 2021-03-11 DIAGNOSIS — I47.1 SUPRAVENTRICULAR TACHYCARDIA (HCC): ICD-10-CM

## 2021-03-11 RX ORDER — PRAVASTATIN SODIUM 20 MG/1
TABLET ORAL
Qty: 90 TAB | Refills: 3 | Status: SHIPPED | OUTPATIENT
Start: 2021-03-11 | End: 2021-12-01

## 2021-03-11 RX ORDER — METOPROLOL SUCCINATE 25 MG/1
TABLET, EXTENDED RELEASE ORAL
Qty: 45 TAB | Refills: 3 | Status: SHIPPED | OUTPATIENT
Start: 2021-03-11 | End: 2022-03-06

## 2021-03-11 NOTE — TELEPHONE ENCOUNTER
Requested Prescriptions     Pending Prescriptions Disp Refills    metoprolol succinate (TOPROL-XL) 25 mg XL tablet 45 Tab 3     Sig: TAKE 1/2 TABLET BY MOUTH ONCE DAILY    pravastatin (PRAVACHOL) 20 mg tablet 90 Tab 3

## 2021-03-11 NOTE — TELEPHONE ENCOUNTER
PCP: Abilio Courtney MD    Last appt: 11/30/2020  Future Appointments   Date Time Provider Sharon Calzada   12/1/2021 10:30 AM Abilio Courtney MD [facility]       Requested Prescriptions     Pending Prescriptions Disp Refills    metoprolol succinate (TOPROL-XL) 25 mg XL tablet 45 Tab 3     Sig: TAKE 1/2 TABLET BY MOUTH ONCE DAILY    pravastatin (PRAVACHOL) 20 mg tablet 90 Tab 3

## 2021-12-01 ENCOUNTER — OFFICE VISIT (OUTPATIENT)
Dept: INTERNAL MEDICINE CLINIC | Age: 81
End: 2021-12-01
Payer: MEDICARE

## 2021-12-01 VITALS
HEIGHT: 61 IN | BODY MASS INDEX: 21.94 KG/M2 | WEIGHT: 116.2 LBS | OXYGEN SATURATION: 98 % | DIASTOLIC BLOOD PRESSURE: 80 MMHG | HEART RATE: 58 BPM | SYSTOLIC BLOOD PRESSURE: 120 MMHG | RESPIRATION RATE: 16 BRPM | TEMPERATURE: 97.8 F

## 2021-12-01 DIAGNOSIS — I47.1 SUPRAVENTRICULAR TACHYCARDIA (HCC): ICD-10-CM

## 2021-12-01 DIAGNOSIS — E78.00 HYPERCHOLESTEROLEMIA: ICD-10-CM

## 2021-12-01 DIAGNOSIS — Z11.59 ENCOUNTER FOR HEPATITIS C SCREENING TEST FOR LOW RISK PATIENT: ICD-10-CM

## 2021-12-01 DIAGNOSIS — F41.9 ANXIETY: ICD-10-CM

## 2021-12-01 DIAGNOSIS — M85.80 OSTEOPENIA, UNSPECIFIED LOCATION: ICD-10-CM

## 2021-12-01 DIAGNOSIS — Z23 NEEDS FLU SHOT: ICD-10-CM

## 2021-12-01 DIAGNOSIS — Z00.00 WELCOME TO MEDICARE PREVENTIVE VISIT: Primary | ICD-10-CM

## 2021-12-01 PROCEDURE — 90471 IMMUNIZATION ADMIN: CPT | Performed by: INTERNAL MEDICINE

## 2021-12-01 PROCEDURE — 90694 VACC AIIV4 NO PRSRV 0.5ML IM: CPT | Performed by: INTERNAL MEDICINE

## 2021-12-01 PROCEDURE — 99214 OFFICE O/P EST MOD 30 MIN: CPT | Performed by: INTERNAL MEDICINE

## 2021-12-01 PROCEDURE — G0402 INITIAL PREVENTIVE EXAM: HCPCS | Performed by: INTERNAL MEDICINE

## 2021-12-01 RX ORDER — FLUVASTATIN 20 MG/1
20 CAPSULE ORAL
COMMUNITY
End: 2022-04-19

## 2021-12-01 RX ORDER — BUSPIRONE HYDROCHLORIDE 5 MG/1
5 TABLET ORAL 2 TIMES DAILY
Qty: 60 TABLET | Refills: 3 | Status: SHIPPED | OUTPATIENT
Start: 2021-12-01 | End: 2022-02-14 | Stop reason: SDUPTHER

## 2021-12-01 NOTE — PROGRESS NOTES
Lilian Danni  Identified pt with two pt identifiers(name and ). Chief Complaint   Patient presents with    Annual Wellness Visit    Immunization/Injection     Flu vaccine        Reviewed record In preparation for visit and have obtained necessary documentation. 1. Have you been to the ER, urgent care clinic or hospitalized since your last visit? No     2. Have you seen or consulted any other health care providers outside of the [address] [place] [address] since your last visit? Include any pap smears or colon screening. No    Patient does not have an advance directive. Vitals reviewed with provider.     Health Maintenance reviewed:     Health Maintenance Due   Topic    Hepatitis C Screening     Flu Vaccine (1)    Lipid Screen           Wt Readings from Last 3 Encounters:   21 116 lb 3.2 oz (52.7 kg)   20 114 lb (51.7 kg)   20 113 lb 8.6 oz (51.5 kg)        Temp Readings from Last 3 Encounters:   21 97.8 °F (36.6 °C) (Oral)   20 98.2 °F (36.8 °C) (Oral)   20 99 °F (37.2 °C)        BP Readings from Last 3 Encounters:   20 127/70   20 94/49   10/31/19 148/79        Pulse Readings from Last 3 Encounters:   21 (!) 58   20 (!) 56   20 77        Vitals:    21 1034   Pulse: (!) 58   Resp: 16   Temp: 97.8 °F (36.6 °C)   TempSrc: Oral   SpO2: 98%   Weight: 116 lb 3.2 oz (52.7 kg)   Height: 5' 1\" (1.549 m)   PainSc:   0 - No pain          Learning Assessment:   :       Learning Assessment 2015   PRIMARY LEARNER Patient Patient   BARRIERS PRIMARY LEARNER - NONE   CO-LEARNER CAREGIVER - No   PRIMARY LANGUAGE ENGLISH ENGLISH    NEED - No   LEARNER PREFERENCE PRIMARY READING DEMONSTRATION   ANSWERED BY patient patient   RELATIONSHIP SELF SELF        Depression Screening:   :       3 most recent PHQ Screens 2021   Little interest or pleasure in doing things Not at all   Feeling down, depressed, irritable, or hopeless Not at all   Total Score PHQ 2 0        Fall Risk Assessment:   :       Fall Risk Assessment, last 12 mths 12/1/2021   Able to walk? Yes   Fall in past 12 months? 0   Do you feel unsteady? 0   Are you worried about falling 0        Abuse Screening:   :       Abuse Screening Questionnaire 12/1/2021 11/30/2020 10/31/2019 10/30/2018 10/30/2017 7/14/2014   Do you ever feel afraid of your partner? N N N N N N   Are you in a relationship with someone who physically or mentally threatens you? N N N N N N   Is it safe for you to go home?  Y Y Y Y Y Y        ADL Screening:   :       ADL Assessment 12/1/2021   Feeding yourself No Help Needed   Getting from bed to chair No Help Needed   Getting dressed No Help Needed   Bathing or showering No Help Needed   Walk across the room (includes cane/walker) No Help Needed   Using the telphone No Help Needed   Taking your medications No Help Needed   Preparing meals No Help Needed   Managing money (expenses/bills) No Help Needed   Moderately strenuous housework (laundry) No Help Needed   Shopping for personal items (toiletries/medicines) No Help Needed   Shopping for groceries No Help Needed   Driving No Help Needed   Climbing a flight of stairs No Help Needed   Getting to places beyond walking distances No Help Needed

## 2021-12-01 NOTE — PATIENT INSTRUCTIONS
Vaccine Information Statement    Influenza (Flu) Vaccine (Inactivated or Recombinant): What You Need to Know    Many vaccine information statements are available in Croatian and other languages. See www.immunize.org/vis. Hojas de información sobre vacunas están disponibles en español y en muchos otros idiomas. Visite www.immunize.org/vis. 1. Why get vaccinated? Influenza vaccine can prevent influenza (flu). Flu is a contagious disease that spreads around the [facility] every year, usually between October and May. Anyone can get the flu, but it is more dangerous for some people. Infants and young children, people 72 years and older, pregnant people, and people with certain health conditions or a weakened immune system are at greatest risk of flu complications. Pneumonia, bronchitis, sinus infections, and ear infections are examples of flu-related complications. If you have a medical condition, such as heart disease, cancer, or diabetes, flu can make it worse. Flu can cause fever and chills, sore throat, muscle aches, fatigue, cough, headache, and runny or stuffy nose. Some people may have vomiting and diarrhea, though this is more common in children than adults. In an average year, thousands of people in the [facility] die from flu, and many more are hospitalized. Flu vaccine prevents millions of illnesses and flu-related visits to the doctor each year. 2. Influenza vaccines     CDC recommends everyone 6 months and older get vaccinated every flu season. Children 6 months through 6years of age may need 2 doses during a single flu season. Everyone else needs only 1 dose each flu season. It takes about 2 weeks for protection to develop after vaccination. There are many flu viruses, and they are always changing. Each year a new flu vaccine is made to protect against the influenza viruses believed to be likely to cause disease in the upcoming flu season.  Even when the vaccine doesnt exactly match these viruses, it may still provide some protection. Influenza vaccine does not cause flu. Influenza vaccine may be given at the same time as other vaccines. 3. Talk with your health care provider    Tell your vaccination provider if the person getting the vaccine:   Has had an allergic reaction after a previous dose of influenza vaccine, or has any severe, life-threatening allergies    Has ever had Guillain-Barré Syndrome (also called GBS)    In some cases, your health care provider may decide to postpone influenza vaccination until a future visit. Influenza vaccine can be administered at any time during pregnancy. People who are or will be pregnant during influenza season should receive inactivated influenza vaccine. People with minor illnesses, such as a cold, may be vaccinated. People who are moderately or severely ill should usually wait until they recover before getting influenza vaccine. Your health care provider can give you more information. 4. Risks of a vaccine reaction     Soreness, redness, and swelling where the shot is given, fever, muscle aches, and headache can happen after influenza vaccination.  There may be a very small increased risk of Guillain-Barré Syndrome (GBS) after inactivated influenza vaccine (the flu shot). Raysilva Blissist children who get the flu shot along with pneumococcal vaccine (PCV13) and/or DTaP vaccine at the same time might be slightly more likely to have a seizure caused by fever. Tell your health care provider if a child who is getting flu vaccine has ever had a seizure. People sometimes faint after medical procedures, including vaccination. Tell your provider if you feel dizzy or have vision changes or ringing in the ears. As with any medicine, there is a very remote chance of a vaccine causing a severe allergic reaction, other serious injury, or death. 5. What if there is a serious problem?     An allergic reaction could occur after the vaccinated person leaves the clinic. If you see signs of a severe allergic reaction (hives, swelling of the face and throat, difficulty breathing, a fast heartbeat, dizziness, or weakness), call 9-1-1 and get the person to the nearest hospital.    For other signs that concern you, call your health care provider. Adverse reactions should be reported to the Vaccine Adverse Event Reporting System (VAERS). Your health care provider will usually file this report, or you can do it yourself. Visit the VAERS website at www.vaers. [facility].gov or call 6-456.981.6411. VAERS is only for reporting reactions, and VAERS staff members do not give medical advice. 6. The National Vaccine Injury Compensation Program    The [facility] Vaccine Injury Compensation Program (VICP) is a federal program that was created to compensate people who may have been injured by certain vaccines. Claims regarding alleged injury or death due to vaccination have a time limit for filing, which may be as short as two years. Visit the VICP website at www.[facility].gov/vaccinecompensation or call 4-588.729.5228 to learn about the program and about filing a claim. 7. How can I learn more?  Ask your health care provider.  Call your local or state health department.  Visit the website of the Food and Drug Administration (FDA) for vaccine package inserts and additional information at www.fda.gov/vaccines-blood-biologics/vaccines.  Contact the Centers for Disease Control and Prevention (CDC):  - Call 6-808.659.9088 (1-800-CDC-INFO) or  - Visit CDCs influenza website at www.cdc.gov/flu. Vaccine Information Statement   Inactivated Influenza Vaccine   8/6/2021  42 GRACE Soto [id number]-[id number]   Department of Health and Human Services  Centers for Disease Control and Prevention    Office Use Only      Medicare Wellness Visit, Female     The best way to live healthy is to have a lifestyle where you eat a well-balanced diet, exercise regularly, limit alcohol use, and quit all forms of tobacco/nicotine, if applicable. Regular preventive services are another way to keep healthy. Preventive services (vaccines, screening tests, monitoring & exams) can help personalize your care plan, which helps you manage your own care. Screening tests can find health problems at the earliest stages, when they are easiest to treat. [facility] follows the current, evidence-based guidelines published by the [facility] An[facility] Angelia ([facility]) when recommending preventive services for our patients. Because we follow these guidelines, sometimes recommendations change over time as research supports it. (For example, mammograms used to be recommended annually. Even though Medicare will still pay for an annual mammogram, the newer guidelines recommend a mammogram every two years for women of average risk). Of course, you and your doctor may decide to screen more often for some diseases, based on your risk and your co-morbidities (chronic disease you are already diagnosed with). Preventive services for you include:  - Medicare offers their members a free annual wellness visit, which is time for you and your primary care provider to discuss and plan for your preventive service needs. Take advantage of this benefit every year!  -All adults over the age of 72 should receive the recommended pneumonia vaccines. Current USPSTF guidelines recommend a series of two vaccines for the best pneumonia protection.   -All adults should have a flu vaccine yearly and a tetanus vaccine every 10 years.   -All adults age 48 and older should receive the shingles vaccines (series of two vaccines).       -All adults age 38-68 who are overweight should have a diabetes screening test once every three years.   -All adults born between [birth year] and 1965 should be screened once for Hepatitis C.  -Other screening tests and preventive services for persons with diabetes include: an eye exam to screen for diabetic retinopathy, a kidney function test, a foot exam, and stricter control over your cholesterol.   -Cardiovascular screening for adults with routine risk involves an electrocardiogram (ECG) at intervals determined by your doctor.   -Colorectal cancer screenings should be done for adults age 54-65 with no increased risk factors for colorectal cancer. There are a number of acceptable methods of screening for this type of cancer. Each test has its own benefits and drawbacks. Discuss with your doctor what is most appropriate for you during your annual wellness visit. The different tests include: colonoscopy (considered the best screening method), a fecal occult blood test, a fecal DNA test, and sigmoidoscopy.    -A bone mass density test is recommended when a woman turns 65 to screen for osteoporosis. This test is only recommended one time, as a screening. Some providers will use this same test as a disease monitoring tool if you already have osteoporosis. -Breast cancer screenings are recommended every other year for women of normal risk, age 54-69.  -Cervical cancer screenings for women over age 72 are only recommended with certain risk factors.      Here is a list of your current Health Maintenance items (your personalized list of preventive services) with a due date:  Health Maintenance Due   Topic Date Due    Hepatitis C Test  Never done    Yearly Flu Vaccine (1) 09/01/2021    Cholesterol Test   11/27/2021

## 2021-12-01 NOTE — PROGRESS NOTES
HPI  Ms. Grisel Roa is a 80y.o. year old female, she is seen today for well exam.     Dennys Rodriguez  in August,  in July - dementia. Had to plan both funerals. Has been more upset, under more stress. Overall mood is good, will get emotional at times. Says when she took alprazolam daily for a week she felt agitated. Asking for an alternative. Not feeling down or depressed. Will feel sad at times. No SI. Mostly anxious, stressed. Has had medicare since July. No chest pain, sob, dizziness, weakness, lightheadedness. No palpitations. Appetite good. Sees gyn once yearly. Chief Complaint   Patient presents with    Annual Wellness Visit    Immunization/Injection     Flu vaccine         Prior to Admission medications    Medication Sig Start Date End Date Taking? Authorizing Provider   fluvastatin (LESCOL) 20 mg capsule Take 20 mg by mouth nightly. Yes Provider, Historical   busPIRone (BUSPAR) 5 mg tablet Take 1 Tablet by mouth two (2) times a day. 21  Yes Sepideh Ordoñez MD   metoprolol succinate (TOPROL-XL) 25 mg XL tablet TAKE 1/2 TABLET BY MOUTH ONCE DAILY 3/11/21  Yes Sepideh Ordoñez MD   ergocalciferol, vitamin D2, (VITAMIN D2 PO) Take  by mouth. Yes Provider, Historical   estradiol (ESTRACE) 0.5 mg tablet estradiol 0.5 mg tablet   1 po daily   Yes Provider, Historical   pravastatin (PRAVACHOL) 20 mg tablet TAKE 1 TABLET BY MOUTH AT BEDTIME  Patient not taking: Reported on 2021 3/11/21 12/1/21  Sepideh Ordoñez MD   ALPRAZolam Verlene Keny) 0.25 mg tablet Take 0.25 mg by mouth two (2) times daily as needed. Anxiety  Patient not taking: Reported on 2021  Provider, Historical   ondansetron (ZOFRAN ODT) 4 mg disintegrating tablet Take 1 Tab by mouth every eight (8) hours as needed for Nausea. Patient not taking: Reported on 2021  aJvier Augustine MD   zolpidem (AMBIEN) 5 mg tablet Take 1 Tab by mouth nightly as needed for Sleep.  Max Daily Amount: 5 mg. Patient not taking: Reported on 12/1/2021 11/29/18 12/1/21  Summer Perry MD   acetaminophen (TYLENOL ARTHRITIS PAIN) 650 mg CR tablet Take 650 mg by mouth daily. Patient not taking: Reported on 12/1/2021 12/1/21  Provider, Historical   PREMARIN 0.625 mg/gram vaginal cream Use when needs vaginal exam  Patient not taking: Reported on 12/1/2021 9/14/15 12/1/21  Provider, Historical         Allergies   Allergen Reactions    Zanaflex [Tizanidine] Other (comments)     Patient denies allergy         REVIEW OF SYSTEMS:  Per HPI    PHYSICAL EXAM:  Visit Vitals  /80 Comment: Manual cuff   Pulse (!) 58   Temp 97.8 °F (36.6 °C) (Oral)   Resp 16   Ht 5' 1\" (1.549 m)   Wt 116 lb 3.2 oz (52.7 kg)   SpO2 98%   BMI 21.96 kg/m²     Constitutional: Appears well-developed and well-nourished. No distress. HENT:   Head: Normocephalic and atraumatic. Eyes: No scleral icterus. Neck: no lad, no tm, supple   Cardiovascular: Normal S1/S2, regular rhythm. No murmurs, rubs, or gallops. Pulmonary/Chest: Effort normal and breath sounds normal. No respiratory distress. No wheezes, rhonchi, or rales. Abdomen: Soft, NT/ND, +BS, no rebound or guarding, no masses, no HSM appreciated. Ext: No edema. Neurological: Alert. Psychiatric: anxious, tearful at times, good eye contact. Behavior is normal.     Lab Results   Component Value Date/Time    Sodium 142 11/27/2020 01:00 PM    Potassium 4.6 11/27/2020 01:00 PM    Chloride 107 (H) 11/27/2020 01:00 PM    CO2 25 11/27/2020 01:00 PM    Anion gap 7 01/27/2020 05:22 PM    Glucose 87 11/27/2020 01:00 PM    BUN 14 11/27/2020 01:00 PM    Creatinine 0.91 11/27/2020 01:00 PM    BUN/Creatinine ratio 15 11/27/2020 01:00 PM    GFR est AA 70 11/27/2020 01:00 PM    GFR est non-AA 61 11/27/2020 01:00 PM    Calcium 9.2 11/27/2020 01:00 PM    Bilirubin, total 0.4 11/27/2020 01:00 PM    Alk.  phosphatase 49 11/27/2020 01:00 PM    Protein, total 6.3 11/27/2020 01:00 PM Albumin 3.7 11/27/2020 01:00 PM    Globulin 3.6 01/27/2020 05:22 PM    A-G Ratio 1.4 11/27/2020 01:00 PM    ALT (SGPT) 13 11/27/2020 01:00 PM     Lab Results   Component Value Date/Time    Hemoglobin A1c 5.6 10/31/2019 11:30 AM      Lab Results   Component Value Date/Time    Cholesterol, total 201 (H) 11/27/2020 01:00 PM    HDL Cholesterol 93 11/27/2020 01:00 PM    LDL, calculated 96 11/27/2020 01:00 PM    LDL, calculated 100 (H) 11/02/2018 08:50 AM    VLDL, calculated 12 11/27/2020 01:00 PM    VLDL, calculated 11 11/02/2018 08:50 AM    Triglyceride 65 11/27/2020 01:00 PM          ASSESSMENT/PLAN  Diagnoses and all orders for this visit:    1. Welcome to Medicare preventive visit    2. Needs flu shot  -     FLU (FLUAD QUAD INFLUENZA VACCINE,QUAD,ADJUVANTED)    3. Supraventricular tachycardia ([address]  Comments: With near syncope, seen by cardiology Dr. Vivian Marcos, no more episodes since being on metoprolol    4. Hypercholesterolemia  -     METABOLIC PANEL, COMPREHENSIVE; Future  -     LIPID PANEL; Future    5. Osteopenia, unspecified location  Comments:  managed by gyn    6. Encounter for hepatitis C screening test for low risk patient  -     HCV AB W/RFLX TO DEVORAH; Future    7. Anxiety  Comments:  add buspar  Orders:  -     busPIRone (BUSPAR) 5 mg tablet; Take 1 Tablet by mouth two (2) times a day. Health Maintenance Due   Topic Date Due    Hepatitis C Screening  Never done    Lipid Screen  11/27/2021        Follow-up and Dispositions    · Return in about 3 months (around 3/1/2022) for anxiety. Reviewed plan of care. Patient has provided input and agrees with goals. The nurse provided the patient and/or family with advanced directive information if needed and encouraged the patient to provide a copy to the office when available.            This is a \"Welcome to [employer]"  Initial Preventive Physical Examination (IPPE) providing Personalized Prevention Plan Services (Performed in the first 12 months of enrollment)    I have reviewed the patient's medical history in detail and updated the computerized patient record. Assessment/Plan   Education and counseling provided:  Are appropriate based on today's review and evaluation    1. Welcome to Medicare preventive visit  2. Needs flu shot  -     FLU (FLUAD QUAD INFLUENZA VACCINE,QUAD,ADJUVANTED)  3. Supraventricular tachycardia ([address]  Comments: With near syncope, seen by cardiology Dr. Raj Joseph, no more episodes since being on metoprolol  4. Hypercholesterolemia  -     METABOLIC PANEL, COMPREHENSIVE; Future  -     LIPID PANEL; Future  5. Osteopenia, unspecified location  Comments:  managed by gyn  6. Encounter for hepatitis C screening test for low risk patient  -     HCV AB W/RFLX TO DEVORAH; Future  7. Anxiety  Comments:  add buspar  Orders:  -     busPIRone (BUSPAR) 5 mg tablet; Take 1 Tablet by mouth two (2) times a day., Normal, Disp-60 Tablet, R-3       Depression Risk Screen     3 most recent PHQ Screens 12/1/2021   Little interest or pleasure in doing things Not at all   Feeling down, depressed, irritable, or hopeless Not at all   Total Score PHQ 2 0       Alcohol Risk Screen    Do you average more than 1 drink per night or more than 7 drinks a week:  No    On any one occasion in the past three months have you have had more than 3 drinks containing alcohol:  No          Functional Ability and Level of Safety    Diet: No special diet      Hearing: Hearing is good. Vision Screening:  Vision is good. Visual Acuity Screening    Right eye Left eye Both eyes   Without correction:      With correction: 20/25 20/25 20/20         Activities of Daily Living: The home contains: no safety equipment. Patient does total self care      Ambulation: with no difficulty      Exercise level: moderately active     Fall Risk Screen:  Fall Risk Assessment, last 12 mths 12/1/2021   Able to walk? Yes   Fall in past 12 months? 0   Do you feel unsteady?  0   Are you worried about falling 0      Abuse Screen:  Patient is not abused       Screening EKG   EKG order placed: No    End of Life Planning   Advanced care planning directives were discussed with the patient and /or family/caregiver. Health Maintenance Due     Health Maintenance Due   Topic Date Due    Hepatitis C Screening  Never done    Lipid Screen  11/27/2021       Patient Care Team   Patient Care Team:  Milka Luo MD as PCP - General (Internal Medicine)  Milka Luo MD as PCP - [facility] Empaneled Provider    History     Past Medical History:   Diagnosis Date    Arrhythmia     SVT    Chronic erosive gastritis     Menopause 1983    Neuropathy     Osteopenia     Postmenopausal HRT (hormone replacement therapy) 1983    Stopped in 7/2018    SCC (squamous cell carcinoma), arm 2014    left upper arm      Past Surgical History:   Procedure Laterality Date    ENDOSCOPY, COLON, DIAGNOSTIC      HX BREAST AUGMENTATION      HX HYSTERECTOMY  1983    HX OOPHORECTOMY Bilateral 1983    IMPLANT BREAST SILICONE/EQ Bilateral 8455     Current Outpatient Medications   Medication Sig Dispense Refill    fluvastatin (LESCOL) 20 mg capsule Take 20 mg by mouth nightly.  busPIRone (BUSPAR) 5 mg tablet Take 1 Tablet by mouth two (2) times a day. 60 Tablet 3    metoprolol succinate (TOPROL-XL) 25 mg XL tablet TAKE 1/2 TABLET BY MOUTH ONCE DAILY 45 Tab 3    ergocalciferol, vitamin D2, (VITAMIN D2 PO) Take  by mouth.       estradiol (ESTRACE) 0.5 mg tablet estradiol 0.5 mg tablet   1 po daily       Allergies   Allergen Reactions    Zanaflex [Tizanidine] Other (comments)     Patient denies allergy       Family History   Problem Relation Age of Onset    Cancer Father         Lung Cancer    Alcohol abuse Mother         hepatic cirrhosis    Alcohol abuse Sister     Stroke Paternal Uncle      Social History     Tobacco Use    Smoking status: Never Smoker    Smokeless tobacco: Never Used   Substance Use Topics  Alcohol use: No       Jese Go MD

## 2022-02-14 DIAGNOSIS — F41.9 ANXIETY: ICD-10-CM

## 2022-02-14 RX ORDER — BUSPIRONE HYDROCHLORIDE 5 MG/1
5 TABLET ORAL 2 TIMES DAILY
Qty: 180 TABLET | Refills: 1 | Status: SHIPPED | OUTPATIENT
Start: 2022-02-14 | End: 2022-03-01

## 2022-02-14 NOTE — TELEPHONE ENCOUNTER
PCP: Yudelka Diaz MD    Last appt: 12/1/2021  Future Appointments   Date Time Provider Sharon Calzada   3/1/2022 10:45 AM Yudelka Diaz MD [facility] AMB       Requested Prescriptions     Pending Prescriptions Disp Refills    busPIRone (BUSPAR) 5 mg tablet 180 Tablet 1     Sig: Take 1 Tablet by mouth two (2) times a day.

## 2022-02-14 NOTE — TELEPHONE ENCOUNTER
Requested Prescriptions     Pending Prescriptions Disp Refills    busPIRone (BUSPAR) 5 mg tablet 60 Tablet 3     Sig: Take 1 Tablet by mouth two (2) times a day. Pharmacy needs 90  days of RX.

## 2022-03-01 ENCOUNTER — OFFICE VISIT (OUTPATIENT)
Dept: INTERNAL MEDICINE CLINIC | Age: 82
End: 2022-03-01
Payer: MEDICARE

## 2022-03-01 VITALS
HEART RATE: 57 BPM | WEIGHT: 115 LBS | DIASTOLIC BLOOD PRESSURE: 72 MMHG | HEIGHT: 61 IN | BODY MASS INDEX: 21.71 KG/M2 | OXYGEN SATURATION: 97 % | SYSTOLIC BLOOD PRESSURE: 119 MMHG | TEMPERATURE: 97.6 F | RESPIRATION RATE: 16 BRPM

## 2022-03-01 DIAGNOSIS — I47.1 SUPRAVENTRICULAR TACHYCARDIA (HCC): ICD-10-CM

## 2022-03-01 DIAGNOSIS — F51.05 INSOMNIA DUE TO OTHER MENTAL DISORDER: ICD-10-CM

## 2022-03-01 DIAGNOSIS — F41.9 ANXIETY: Primary | ICD-10-CM

## 2022-03-01 DIAGNOSIS — E78.00 HYPERCHOLESTEROLEMIA: ICD-10-CM

## 2022-03-01 DIAGNOSIS — F99 INSOMNIA DUE TO OTHER MENTAL DISORDER: ICD-10-CM

## 2022-03-01 PROCEDURE — G8399 PT W/DXA RESULTS DOCUMENT: HCPCS | Performed by: INTERNAL MEDICINE

## 2022-03-01 PROCEDURE — 1090F PRES/ABSN URINE INCON ASSESS: CPT | Performed by: INTERNAL MEDICINE

## 2022-03-01 PROCEDURE — 1101F PT FALLS ASSESS-DOCD LE1/YR: CPT | Performed by: INTERNAL MEDICINE

## 2022-03-01 PROCEDURE — G8420 CALC BMI NORM PARAMETERS: HCPCS | Performed by: INTERNAL MEDICINE

## 2022-03-01 PROCEDURE — G8427 DOCREV CUR MEDS BY ELIG CLIN: HCPCS | Performed by: INTERNAL MEDICINE

## 2022-03-01 PROCEDURE — G8536 NO DOC ELDER MAL SCRN: HCPCS | Performed by: INTERNAL MEDICINE

## 2022-03-01 PROCEDURE — 99214 OFFICE O/P EST MOD 30 MIN: CPT | Performed by: INTERNAL MEDICINE

## 2022-03-01 PROCEDURE — G8510 SCR DEP NEG, NO PLAN REQD: HCPCS | Performed by: INTERNAL MEDICINE

## 2022-03-01 RX ORDER — SERTRALINE HYDROCHLORIDE 25 MG/1
25 TABLET, FILM COATED ORAL DAILY
Qty: 30 TABLET | Refills: 2 | Status: SHIPPED | OUTPATIENT
Start: 2022-03-01 | End: 2022-04-19 | Stop reason: SDUPTHER

## 2022-03-01 NOTE — PROGRESS NOTES
Donnie Turcios  Identified pt with two pt identifiers(name and ). Chief Complaint   Patient presents with   [facility] Anxiety     Room 2A //        Reviewed record In preparation for visit and have obtained necessary documentation. 1. Have you been to the ER, urgent care clinic or hospitalized since your last visit? No     2. Have you seen or consulted any other health care providers outside of the [address] [place] [address] since your last visit? Include any pap smears or colon screening. No    Patient has an advance directive. Vitals reviewed with provider. Health Maintenance reviewed: There are no preventive care reminders to display for this patient.        Wt Readings from Last 3 Encounters:   22 115 lb (52.2 kg)   21 116 lb 3.2 oz (52.7 kg)   20 114 lb (51.7 kg)        Temp Readings from Last 3 Encounters:   22 97.6 °F (36.4 °C) (Oral)   21 97.8 °F (36.6 °C) (Oral)   20 98.2 °F (36.8 °C) (Oral)        BP Readings from Last 3 Encounters:   22 119/72   21 120/80   20 127/70        Pulse Readings from Last 3 Encounters:   22 (!) 57   21 (!) 58   20 (!) 56        Vitals:    22 1043   BP: 119/72   Pulse: (!) 57   Resp: 16   Temp: 97.6 °F (36.4 °C)   TempSrc: Oral   SpO2: 97%   Weight: 115 lb (52.2 kg)   Height: 5' 1\" (1.549 m)   PainSc:   0 - No pain          Learning Assessment:   :       Learning Assessment 2015   PRIMARY LEARNER Patient Patient   BARRIERS PRIMARY LEARNER - NONE   CO-LEARNER CAREGIVER - No   PRIMARY LANGUAGE ENGLISH ENGLISH    NEED - No   LEARNER PREFERENCE PRIMARY READING DEMONSTRATION   ANSWERED BY patient patient   RELATIONSHIP SELF SELF        Depression Screening:   :       3 most recent PHQ Screens 3/1/2022   Little interest or pleasure in doing things Not at all   Feeling down, depressed, irritable, or hopeless Not at all   Total Score PHQ 2 0        Fall Risk Assessment:   :       Fall Risk Assessment, last 12 mths 12/1/2021   Able to walk? Yes   Fall in past 12 months? 0   Do you feel unsteady? 0   Are you worried about falling 0        Abuse Screening:   :       Abuse Screening Questionnaire 12/1/2021 11/30/2020 10/31/2019 10/30/2018 10/30/2017 7/14/2014   Do you ever feel afraid of your partner? N N N N N N   Are you in a relationship with someone who physically or mentally threatens you? N N N N N N   Is it safe for you to go home?  Y Y Y Y Y Y        ADL Screening:   :       ADL Assessment 12/1/2021   Feeding yourself No Help Needed   Getting from bed to chair No Help Needed   Getting dressed No Help Needed   Bathing or showering No Help Needed   Walk across the room (includes cane/walker) No Help Needed   Using the telphone No Help Needed   Taking your medications No Help Needed   Preparing meals No Help Needed   Managing money (expenses/bills) No Help Needed   Moderately strenuous housework (laundry) No Help Needed   Shopping for personal items (toiletries/medicines) No Help Needed   Shopping for groceries No Help Needed   Driving No Help Needed   Climbing a flight of stairs No Help Needed   Getting to places beyond walking distances No Help Needed

## 2022-03-01 NOTE — PROGRESS NOTES
HPI  Ms. Blanca Narayanan is a 80y.o. year old female, she is seen today for follow up anxiety. Last visit was more anxious, added buspar. Says she hasn't been taking buspar consistently but when takes for few weeks to a month doesn't notice a difference. Mood - about the same - gets emotional easily, unusual for her. Cry easily. Not sad, down or depressed. No SI. Feeling stressed mostly, worried since  passed. Has good support system. Sleep good some days, wakes often at night - but this isn't new. No chest pain, sob, palpitations. Weight stable. Exercises some at home. Part time job on weekends - cleaning an office building. Takes 5 hours. Appetite good. Chief Complaint   Patient presents with    Anxiety     Room 2A //         Prior to Admission medications    Medication Sig Start Date End Date Taking? Authorizing Provider   sertraline (ZOLOFT) 25 mg tablet Take 1 Tablet by mouth daily. 3/1/22  Yes Anastasia Perez MD   fluvastatin (LESCOL) 20 mg capsule Take 20 mg by mouth nightly. Yes Provider, Historical   metoprolol succinate (TOPROL-XL) 25 mg XL tablet TAKE 1/2 TABLET BY MOUTH ONCE DAILY 3/11/21  Yes Anastasia Perez MD   ergocalciferol, vitamin D2, (VITAMIN D2 PO) Take  by mouth. Yes Provider, Historical   estradiol (ESTRACE) 0.5 mg tablet estradiol 0.5 mg tablet   1 po daily   Yes Provider, Historical   busPIRone (BUSPAR) 5 mg tablet Take 1 Tablet by mouth two (2) times a day.  2/14/22 3/1/22  Anastasia Perez MD         Allergies   Allergen Reactions    Zanaflex [Tizanidine] Other (comments)     Patient denies allergy         REVIEW OF SYSTEMS:  Per HPI    PHYSICAL EXAM:  Visit Vitals  /72 (BP 1 Location: Left upper arm, BP Patient Position: Sitting, BP Cuff Size: Adult)   Pulse (!) 57   Temp 97.6 °F (36.4 °C) (Oral)   Resp 16   Ht 5' 1\" (1.549 m)   Wt 115 lb (52.2 kg)   SpO2 97%   BMI 21.73 kg/m²     Constitutional: Appears well-developed and well-nourished. No distress. HENT:   Head: Normocephalic and atraumatic. Eyes: No scleral icterus. Neck: no lad, no tm, supple   Cardiovascular: Normal S1/S2, regular rhythm. No murmurs, rubs, or gallops. Pulmonary/Chest: Effort normal and breath sounds normal. No respiratory distress. No wheezes, rhonchi, or rales. Abdomen: Soft, NT/ND, +BS, no rebound or guarding, no masses, no HSM appreciated. Ext: No edema. Neurological: Alert. Psychiatric: Normal mood and affect. Behavior is normal.     Lab Results   Component Value Date/Time    Sodium 143 12/01/2021 12:10 PM    Potassium 4.1 12/01/2021 12:10 PM    Chloride 108 12/01/2021 12:10 PM    CO2 26 12/01/2021 12:10 PM    Anion gap 9 12/01/2021 12:10 PM    Glucose 85 12/01/2021 12:10 PM    BUN 25 (H) 12/01/2021 12:10 PM    Creatinine 0.82 12/01/2021 12:10 PM    BUN/Creatinine ratio 30 (H) 12/01/2021 12:10 PM    GFR est AA >60 12/01/2021 12:10 PM    GFR est non-AA >60 12/01/2021 12:10 PM    Calcium 8.7 12/01/2021 12:10 PM    Bilirubin, total 0.4 12/01/2021 12:10 PM    Alk. phosphatase 50 12/01/2021 12:10 PM    Protein, total 6.3 (L) 12/01/2021 12:10 PM    Albumin 3.2 (L) 12/01/2021 12:10 PM    Globulin 3.1 12/01/2021 12:10 PM    A-G Ratio 1.0 (L) 12/01/2021 12:10 PM    ALT (SGPT) 30 12/01/2021 12:10 PM     Lab Results   Component Value Date/Time    Hemoglobin A1c 5.6 10/31/2019 11:30 AM      Lab Results   Component Value Date/Time    Cholesterol, total 200 (H) 12/01/2021 12:10 PM    HDL Cholesterol 87 12/01/2021 12:10 PM    LDL, calculated 86.2 12/01/2021 12:10 PM    VLDL, calculated 26.8 12/01/2021 12:10 PM    Triglyceride 134 12/01/2021 12:10 PM    CHOL/HDL Ratio 2.3 12/01/2021 12:10 PM          ASSESSMENT/PLAN  Diagnoses and all orders for this visit:    1. Anxiety  -     sertraline (ZOLOFT) 25 mg tablet; Take 1 Tablet by mouth daily. 2. Supraventricular tachycardia ([address]  Comments:   With near syncope, seen by cardiology Dr. Frederick Zuñiga, no more episodes since being on metoprolol    3. Hypercholesterolemia    4. Insomnia due to other mental disorder      Start sertraline, anxiety not controlled, stop buspar - not helping    There are no preventive care reminders to display for this patient. Follow-up and Dispositions    · Return in about 6 weeks (around 4/12/2022) for med eval.            Reviewed plan of care. Patient has provided input and agrees with goals. The nurse provided the patient and/or family with advanced directive information if needed and encouraged the patient to provide a copy to the office when available.

## 2022-03-19 PROBLEM — M54.2 NECK PAIN: Status: ACTIVE | Noted: 2018-10-30

## 2022-03-19 PROBLEM — F41.9 ANXIETY: Status: ACTIVE | Noted: 2019-01-21

## 2022-03-23 DIAGNOSIS — F41.9 ANXIETY: ICD-10-CM

## 2022-03-23 RX ORDER — SERTRALINE HYDROCHLORIDE 25 MG/1
25 TABLET, FILM COATED ORAL DAILY
Qty: 30 TABLET | Refills: 2 | Status: CANCELLED | OUTPATIENT
Start: 2022-03-23

## 2022-03-23 NOTE — TELEPHONE ENCOUNTER
Requested Prescriptions     Pending Prescriptions Disp Refills    sertraline (ZOLOFT) 25 mg tablet 30 Tablet 2     Sig: Take 1 Tablet by mouth daily.

## 2022-03-23 NOTE — TELEPHONE ENCOUNTER
I called the pharmacy and they did not receive the prescription from 03.01. I called it in while one the phone with them.

## 2022-04-18 ENCOUNTER — PATIENT MESSAGE (OUTPATIENT)
Dept: INTERNAL MEDICINE CLINIC | Age: 82
End: 2022-04-18

## 2022-04-18 DIAGNOSIS — E78.00 HYPERCHOLESTEROLEMIA: ICD-10-CM

## 2022-04-18 DIAGNOSIS — F41.9 ANXIETY: ICD-10-CM

## 2022-04-19 RX ORDER — PRAVASTATIN SODIUM 20 MG/1
TABLET ORAL
Qty: 90 TABLET | Refills: 3 | Status: SHIPPED | OUTPATIENT
Start: 2022-04-19

## 2022-04-19 RX ORDER — SERTRALINE HYDROCHLORIDE 25 MG/1
25 TABLET, FILM COATED ORAL DAILY
Qty: 90 TABLET | Refills: 0 | Status: SHIPPED | OUTPATIENT
Start: 2022-04-19 | End: 2022-07-07

## 2022-04-19 NOTE — TELEPHONE ENCOUNTER
From: Kiko Epstein  To: Bouchra Pillai MD  Sent: 4/18/2022 5:44 PM EDT  Subject: medication refill    Pravastatin needs refilled  also [facility] in [place] was wanting to refill Sertraline for three months and have not heard back from you.  I only have one refill left so would like for it to be extended    thank you  Charlie Santos  was going to ask these questions until my appointment was changed until June

## 2022-04-19 NOTE — TELEPHONE ENCOUNTER
I called the patient and verified them by name and date of birth. She is not home currently and stated that she spelled out what she needed refilled.

## 2022-04-19 NOTE — TELEPHONE ENCOUNTER
The patient called back and spelled the name of the medication. It was pravastatin. I informed her that the medication has been sent in.

## 2022-04-19 NOTE — TELEPHONE ENCOUNTER
PCP: Manju Lynch MD     Last appt: 3/1/2022     Future Appointments   Date Time Provider Sharon Calzada   6/6/2022 11:30 AM Manju Lynch MD [facility] AMB          Requested Prescriptions     Pending Prescriptions Disp Refills    sertraline (ZOLOFT) 25 mg tablet 90 Tablet 0     Sig: Take 1 Tablet by mouth daily.     pravastatin (PRAVACHOL) 20 mg tablet 90 Tablet 3     Sig: TAKE 1 TABLET BY MOUTH AT BEDTIME

## 2022-06-06 ENCOUNTER — OFFICE VISIT (OUTPATIENT)
Dept: INTERNAL MEDICINE CLINIC | Age: 82
End: 2022-06-06
Payer: MEDICARE

## 2022-06-06 VITALS
TEMPERATURE: 97.9 F | HEIGHT: 61 IN | SYSTOLIC BLOOD PRESSURE: 130 MMHG | WEIGHT: 117.8 LBS | HEART RATE: 59 BPM | OXYGEN SATURATION: 98 % | BODY MASS INDEX: 22.24 KG/M2 | RESPIRATION RATE: 16 BRPM | DIASTOLIC BLOOD PRESSURE: 70 MMHG

## 2022-06-06 DIAGNOSIS — E78.00 HYPERCHOLESTEROLEMIA: ICD-10-CM

## 2022-06-06 DIAGNOSIS — F41.9 ANXIETY: Primary | ICD-10-CM

## 2022-06-06 PROCEDURE — G8536 NO DOC ELDER MAL SCRN: HCPCS | Performed by: INTERNAL MEDICINE

## 2022-06-06 PROCEDURE — G8427 DOCREV CUR MEDS BY ELIG CLIN: HCPCS | Performed by: INTERNAL MEDICINE

## 2022-06-06 PROCEDURE — 1101F PT FALLS ASSESS-DOCD LE1/YR: CPT | Performed by: INTERNAL MEDICINE

## 2022-06-06 PROCEDURE — G8510 SCR DEP NEG, NO PLAN REQD: HCPCS | Performed by: INTERNAL MEDICINE

## 2022-06-06 PROCEDURE — 99213 OFFICE O/P EST LOW 20 MIN: CPT | Performed by: INTERNAL MEDICINE

## 2022-06-06 PROCEDURE — G8420 CALC BMI NORM PARAMETERS: HCPCS | Performed by: INTERNAL MEDICINE

## 2022-06-06 PROCEDURE — 1123F ACP DISCUSS/DSCN MKR DOCD: CPT | Performed by: INTERNAL MEDICINE

## 2022-06-06 PROCEDURE — 1090F PRES/ABSN URINE INCON ASSESS: CPT | Performed by: INTERNAL MEDICINE

## 2022-06-06 PROCEDURE — G8399 PT W/DXA RESULTS DOCUMENT: HCPCS | Performed by: INTERNAL MEDICINE

## 2022-06-06 NOTE — PROGRESS NOTES
HPI  Ms. Abbey Franks is a 80y.o. year old female, she is seen today for follow up anxiety. Has been good - feeling more even - not sleepy, not agitated, no anxiety or stress. Sertraline has  been working well. Not sad, down or depressed. Appetite is good. Chief Complaint   Patient presents with    Medication Evaluation     Room 2B //         Prior to Admission medications    Medication Sig Start Date End Date Taking? Authorizing Provider   sertraline (ZOLOFT) 25 mg tablet Take 1 Tablet by mouth daily. 4/19/22  Yes Bhupinder Montiel MD   pravastatin (PRAVACHOL) 20 mg tablet TAKE 1 TABLET BY MOUTH AT BEDTIME 4/19/22  Yes Bhupinder Montiel MD   metoprolol succinate (TOPROL-XL) 25 mg XL tablet TAKE 1/2 TABLET BY MOUTH EVERY DAY 3/6/22  Yes Bhupinder Montiel MD   ergocalciferol, vitamin D2, (VITAMIN D2 PO) Take  by mouth. Yes Provider, Historical   estradiol (ESTRACE) 0.5 mg tablet estradiol 0.5 mg tablet   1 po daily   Yes Provider, Historical         Allergies   Allergen Reactions    Zanaflex [Tizanidine] Other (comments)     Patient denies allergy         REVIEW OF SYSTEMS:  Per HPI    PHYSICAL EXAM:  Visit Vitals  /70   Pulse (!) 59   Temp 97.9 °F (36.6 °C) (Oral)   Resp 16   Ht 5' 1\" (1.549 m)   Wt 117 lb 12.8 oz (53.4 kg)   SpO2 98%   BMI 22.26 kg/m²     Constitutional: Appears well-developed and well-nourished. No distress. HENT:   Head: Normocephalic and atraumatic. Eyes: No scleral icterus. Cardiovascular: Normal S1/S2, regular rhythm. No murmurs, rubs, or gallops. Pulmonary/Chest: Effort normal and breath sounds normal. No respiratory distress. No wheezes, rhonchi, or rales. Ext: No edema. Neurological: Alert. Psychiatric: Normal mood and affect.  Behavior is normal.     Lab Results   Component Value Date/Time    Sodium 143 12/01/2021 12:10 PM    Potassium 4.1 12/01/2021 12:10 PM    Chloride 108 12/01/2021 12:10 PM    CO2 26 12/01/2021 12:10 PM    Anion gap 9 12/01/2021 12:10 PM    Glucose 85 12/01/2021 12:10 PM    BUN 25 (H) 12/01/2021 12:10 PM    Creatinine 0.82 12/01/2021 12:10 PM    BUN/Creatinine ratio 30 (H) 12/01/2021 12:10 PM    GFR est AA >60 12/01/2021 12:10 PM    GFR est non-AA >60 12/01/2021 12:10 PM    Calcium 8.7 12/01/2021 12:10 PM    Bilirubin, total 0.4 12/01/2021 12:10 PM    Alk. phosphatase 50 12/01/2021 12:10 PM    Protein, total 6.3 (L) 12/01/2021 12:10 PM    Albumin 3.2 (L) 12/01/2021 12:10 PM    Globulin 3.1 12/01/2021 12:10 PM    A-G Ratio 1.0 (L) 12/01/2021 12:10 PM    ALT (SGPT) 30 12/01/2021 12:10 PM     Lab Results   Component Value Date/Time    Hemoglobin A1c 5.6 10/31/2019 11:30 AM      Lab Results   Component Value Date/Time    Cholesterol, total 200 (H) 12/01/2021 12:10 PM    HDL Cholesterol 87 12/01/2021 12:10 PM    LDL, calculated 86.2 12/01/2021 12:10 PM    VLDL, calculated 26.8 12/01/2021 12:10 PM    Triglyceride 134 12/01/2021 12:10 PM    CHOL/HDL Ratio 2.3 12/01/2021 12:10 PM          ASSESSMENT/PLAN  Diagnoses and all orders for this visit:    1. Anxiety    2. Hypercholesterolemia  -     METABOLIC PANEL, COMPREHENSIVE; Future  -     LIPID PANEL; Future  -     CBC WITH AUTOMATED DIFF; Future      Anxiety controlled on sertraline - continue    There are no preventive care reminders to display for this patient. Follow-up and Dispositions    · Return in about 6 months (around 12/6/2022) for AWV, bp, chol, well exam, labs prior. Reviewed plan of care. Patient has provided input and agrees with goals. The nurse provided the patient and/or family with advanced directive information if needed and encouraged the patient to provide a copy to the office when available.

## 2022-06-06 NOTE — LETTER
7/8/2022 10:35 AM    Ms. Kala Ma, [id number] [id number]    Results for orders placed or performed in visit on 06/06/22   CBC WITH AUTOMATED DIFF   Result Value Ref Range    WBC 5.9 3.4 - 10.8 x10E3/uL    RBC 4.33 3.77 - 5.28 x10E6/uL    HGB 12.7 11.1 - 15.9 g/dL    HCT 38.9 34.0 - 46.6 %    MCV 90 79 - 97 fL    MCH 29.3 26.6 - 33.0 pg    MCHC 32.6 31.5 - 35.7 g/dL    RDW 13.8 11.7 - 15.4 %    PLATELET 112 658 - 410 x10E3/uL    NEUTROPHILS 64 Not Estab. %    Lymphocytes 25 Not Estab. %    MONOCYTES 8 Not Estab. %    EOSINOPHILS 2 Not Estab. %    BASOPHILS 1 Not Estab. %    ABS. NEUTROPHILS 3.8 1.4 - 7.0 x10E3/uL    Abs Lymphocytes 1.5 0.7 - 3.1 x10E3/uL    ABS. MONOCYTES 0.5 0.1 - 0.9 x10E3/uL    ABS. EOSINOPHILS 0.1 0.0 - 0.4 x10E3/uL    ABS. BASOPHILS 0.1 0.0 - 0.2 x10E3/uL    IMMATURE GRANULOCYTES 0 Not Estab. %    ABS. IMM. GRANS. 0.0 0.0 - 0.1 [id number]   METABOLIC PANEL, COMPREHENSIVE   Result Value Ref Range    Glucose 91 65 - 99 mg/dL    BUN 16 8 - 27 mg/dL    Creatinine 0.88 0.57 - 1.00 mg/dL    eGFR 67 >59 mL/min/1.73    BUN/Creatinine ratio 18 12 - 28    Sodium 141 134 - 144 mmol/L    Potassium 4.6 3.5 - 5.2 mmol/L    Chloride 104 96 - 106 mmol/L    CO2 23 20 - 29 mmol/L    Calcium 9.5 8.7 - 10.3 mg/dL    Protein, total 6.8 6.0 - 8.5 g/dL    Albumin 4.3 3.7 - 4.7 g/dL    GLOBULIN, TOTAL 2.5 1.5 - 4.5 g/dL    A-G Ratio 1.7 1.2 - 2.2    Bilirubin, total 0.3 0.0 - 1.2 mg/dL    Alk. phosphatase 57 44 - 121 IU/L    AST (SGOT) 23 0 - 40 IU/L    ALT (SGPT) 13 0 - 32 IU/L   LIPID PANEL   Result Value Ref Range    Cholesterol, total 219 (H) 100 - 199 mg/dL    Triglyceride 67 0 - 149 mg/dL    HDL Cholesterol 103 >39 mg/dL    VLDL, calculated 12 5 - 40 mg/dL    LDL, calculated 104 (H) 0 - 99 mg/dL     RECOMMENDATIONS  Normal kidney and liver labs. Normal blood counts. Cholesterol is good.           Sincerely,      Andre Ludwig MD

## 2022-06-14 LAB
ALBUMIN SERPL-MCNC: 4.3 G/DL (ref 3.7–4.7)
ALBUMIN/GLOB SERPL: 1.7 {RATIO} (ref 1.2–2.2)
ALP SERPL-CCNC: 57 IU/L (ref 44–121)
ALT SERPL-CCNC: 13 IU/L (ref 0–32)
AST SERPL-CCNC: 23 IU/L (ref 0–40)
BASOPHILS # BLD AUTO: 0.1 X10E3/UL (ref 0–0.2)
BASOPHILS NFR BLD AUTO: 1 %
BILIRUB SERPL-MCNC: 0.3 MG/DL (ref 0–1.2)
BUN SERPL-MCNC: 16 MG/DL (ref 8–27)
BUN/CREAT SERPL: 18 (ref 12–28)
CALCIUM SERPL-MCNC: 9.5 MG/DL (ref 8.7–10.3)
CHLORIDE SERPL-SCNC: 104 MMOL/L (ref 96–106)
CHOLEST SERPL-MCNC: 219 MG/DL (ref 100–199)
CO2 SERPL-SCNC: 23 MMOL/L (ref 20–29)
CREAT SERPL-MCNC: 0.88 MG/DL (ref 0.57–1)
EGFR: 67 ML/MIN/1.73
EOSINOPHIL # BLD AUTO: 0.1 X10E3/UL (ref 0–0.4)
EOSINOPHIL NFR BLD AUTO: 2 %
ERYTHROCYTE [DISTWIDTH] IN BLOOD BY AUTOMATED COUNT: 13.8 % (ref 11.7–15.4)
GLOBULIN SER CALC-MCNC: 2.5 G/DL (ref 1.5–4.5)
GLUCOSE SERPL-MCNC: 91 MG/DL (ref 65–99)
HCT VFR BLD AUTO: 38.9 % (ref 34–46.6)
HDLC SERPL-MCNC: 103 MG/DL
HGB BLD-MCNC: 12.7 G/DL (ref 11.1–15.9)
IMM GRANULOCYTES # BLD AUTO: 0 X10E3/UL (ref 0–0.1)
IMM GRANULOCYTES NFR BLD AUTO: 0 %
LDLC SERPL CALC-MCNC: 104 MG/DL (ref 0–99)
LYMPHOCYTES # BLD AUTO: 1.5 X10E3/UL (ref 0.7–3.1)
LYMPHOCYTES NFR BLD AUTO: 25 %
MCH RBC QN AUTO: 29.3 PG (ref 26.6–33)
MCHC RBC AUTO-ENTMCNC: 32.6 G/DL (ref 31.5–35.7)
MCV RBC AUTO: 90 FL (ref 79–97)
MONOCYTES # BLD AUTO: 0.5 X10E3/UL (ref 0.1–0.9)
MONOCYTES NFR BLD AUTO: 8 %
NEUTROPHILS # BLD AUTO: 3.8 X10E3/UL (ref 1.4–7)
NEUTROPHILS NFR BLD AUTO: 64 %
PLATELET # BLD AUTO: 303 X10E3/UL (ref 150–450)
POTASSIUM SERPL-SCNC: 4.6 MMOL/L (ref 3.5–5.2)
PROT SERPL-MCNC: 6.8 G/DL (ref 6–8.5)
RBC # BLD AUTO: 4.33 X10E6/UL (ref 3.77–5.28)
SODIUM SERPL-SCNC: 141 MMOL/L (ref 134–144)
TRIGL SERPL-MCNC: 67 MG/DL (ref 0–149)
VLDLC SERPL CALC-MCNC: 12 MG/DL (ref 5–40)
WBC # BLD AUTO: 5.9 X10E3/UL (ref 3.4–10.8)

## 2022-07-18 DIAGNOSIS — F41.9 ANXIETY: ICD-10-CM

## 2022-07-18 RX ORDER — SERTRALINE HYDROCHLORIDE 25 MG/1
TABLET, FILM COATED ORAL
Qty: 90 TABLET | Refills: 1 | Status: SHIPPED | OUTPATIENT
Start: 2022-07-18

## 2022-12-06 ENCOUNTER — OFFICE VISIT (OUTPATIENT)
Dept: INTERNAL MEDICINE CLINIC | Age: 82
End: 2022-12-06
Payer: MEDICARE

## 2022-12-06 VITALS
BODY MASS INDEX: 22.47 KG/M2 | TEMPERATURE: 97.6 F | WEIGHT: 119 LBS | HEART RATE: 55 BPM | DIASTOLIC BLOOD PRESSURE: 72 MMHG | SYSTOLIC BLOOD PRESSURE: 124 MMHG | HEIGHT: 61 IN | RESPIRATION RATE: 12 BRPM | OXYGEN SATURATION: 98 %

## 2022-12-06 DIAGNOSIS — F41.9 ANXIETY: ICD-10-CM

## 2022-12-06 DIAGNOSIS — Z00.00 MEDICARE ANNUAL WELLNESS VISIT, SUBSEQUENT: Primary | ICD-10-CM

## 2022-12-06 DIAGNOSIS — F51.05 INSOMNIA DUE TO OTHER MENTAL DISORDER: ICD-10-CM

## 2022-12-06 DIAGNOSIS — I47.1 SUPRAVENTRICULAR TACHYCARDIA (HCC): ICD-10-CM

## 2022-12-06 DIAGNOSIS — E78.00 HYPERCHOLESTEROLEMIA: ICD-10-CM

## 2022-12-06 DIAGNOSIS — F99 INSOMNIA DUE TO OTHER MENTAL DISORDER: ICD-10-CM

## 2022-12-06 PROCEDURE — G8427 DOCREV CUR MEDS BY ELIG CLIN: HCPCS | Performed by: INTERNAL MEDICINE

## 2022-12-06 PROCEDURE — 99214 OFFICE O/P EST MOD 30 MIN: CPT | Performed by: INTERNAL MEDICINE

## 2022-12-06 PROCEDURE — 1101F PT FALLS ASSESS-DOCD LE1/YR: CPT | Performed by: INTERNAL MEDICINE

## 2022-12-06 PROCEDURE — G8399 PT W/DXA RESULTS DOCUMENT: HCPCS | Performed by: INTERNAL MEDICINE

## 2022-12-06 PROCEDURE — G8536 NO DOC ELDER MAL SCRN: HCPCS | Performed by: INTERNAL MEDICINE

## 2022-12-06 PROCEDURE — 1123F ACP DISCUSS/DSCN MKR DOCD: CPT | Performed by: INTERNAL MEDICINE

## 2022-12-06 PROCEDURE — G0439 PPPS, SUBSEQ VISIT: HCPCS | Performed by: INTERNAL MEDICINE

## 2022-12-06 PROCEDURE — 1090F PRES/ABSN URINE INCON ASSESS: CPT | Performed by: INTERNAL MEDICINE

## 2022-12-06 PROCEDURE — G8510 SCR DEP NEG, NO PLAN REQD: HCPCS | Performed by: INTERNAL MEDICINE

## 2022-12-06 PROCEDURE — G8420 CALC BMI NORM PARAMETERS: HCPCS | Performed by: INTERNAL MEDICINE

## 2022-12-06 RX ORDER — ZOLPIDEM TARTRATE 6.25 MG/1
6.25 TABLET, FILM COATED, EXTENDED RELEASE ORAL
Qty: 20 TABLET | Refills: 0 | Status: SHIPPED | OUTPATIENT
Start: 2022-12-06

## 2022-12-06 RX ORDER — ASCORBIC ACID 500 MG
TABLET ORAL
COMMUNITY

## 2022-12-06 RX ORDER — UREA 10 %
LOTION (ML) TOPICAL
COMMUNITY

## 2022-12-06 NOTE — PROGRESS NOTES
HPI  Ms. Octavio Reyes is a [de-identified]y.o. year old female, she is seen today for AWV. Last few months has had more issues sleeping - will feel tired but can't sleep. Has had spells like this in the past and Rosanna Brambila has helped - last filled 2016. Only takes it every couple of months. Usually able to reset sleep after using ambien for one night. Anxiety - controlled with zoloft, no SI  No chest pain, sob, weakness. Has had intermittent dizziness for past 2 years - spells will last about 30 seconds - sits and goes away - never happens at rest. No palpitations. No n/v/abd pain, melena, brbpr. Chief Complaint   Patient presents with    Annual Wellness Visit    Hypertension    Sleep Problem        Prior to Admission medications    Medication Sig Start Date End Date Taking? Authorizing Provider   cyanocobalamin (VITAMIN B12) 100 mcg tablet Vitamin B12   Yes Provider, Historical   ascorbic acid, vitamin C, (VITAMIN C) 500 mg tablet Vitamin C   Yes Provider, Historical   zolpidem CR (Ambien CR) 6.25 mg tablet Take 1 Tablet by mouth nightly as needed for Sleep. Max Daily Amount: 6.25 mg. 12/6/22  Yes Mk Billy MD   sertraline (ZOLOFT) 25 mg tablet TAKE 1 TABLET BY MOUTH EVERY DAY 7/18/22  Yes Mk Billy MD   pravastatin (PRAVACHOL) 20 mg tablet TAKE 1 TABLET BY MOUTH AT BEDTIME 4/19/22  Yes Mk Billy MD   metoprolol succinate (TOPROL-XL) 25 mg XL tablet TAKE 1/2 TABLET BY MOUTH EVERY DAY 3/6/22  Yes Mk Billy MD   ergocalciferol (ERGOCALCIFEROL) 1,250 mcg (50,000 unit) capsule Take  by mouth every seven (7) days.    Yes Provider, Historical   estradiol (ESTRACE) 0.5 mg tablet estradiol 0.5 mg tablet   1 po daily   Yes Provider, Historical         Allergies   Allergen Reactions    Zanaflex [Tizanidine] Other (comments)     Patient denies allergy         REVIEW OF SYSTEMS:  Per HPI    PHYSICAL EXAM:  Visit Vitals  /72   Pulse (!) 55   Temp 97.6 °F (36.4 °C) (Oral)   Resp 12   Ht 5' 1\" (1.549 m)   Wt 119 lb (54 kg)   SpO2 98%   BMI 22.48 kg/m²     Constitutional: Appears well-developed and well-nourished. No distress. HENT:   Head: Normocephalic and atraumatic. Eyes: No scleral icterus. Neck: no lad, no tm, supple   Cardiovascular: Normal S1/S2, regular rhythm. No murmurs, rubs, or gallops. Pulmonary/Chest: Effort normal and breath sounds normal. No respiratory distress. No wheezes, rhonchi, or rales. Abdomen: Soft, NT/ND, +BS, no rebound or guarding, no masses, no HSM appreciated. Ext: No edema. Neurological: Alert. Psychiatric: Normal mood and affect. Behavior is normal.     Lab Results   Component Value Date/Time    Sodium 141 06/13/2022 09:55 AM    Potassium 4.6 06/13/2022 09:55 AM    Chloride 104 06/13/2022 09:55 AM    CO2 23 06/13/2022 09:55 AM    Anion gap 9 12/01/2021 12:10 PM    Glucose 91 06/13/2022 09:55 AM    BUN 16 06/13/2022 09:55 AM    Creatinine 0.88 06/13/2022 09:55 AM    BUN/Creatinine ratio 18 06/13/2022 09:55 AM    GFR est AA >60 12/01/2021 12:10 PM    GFR est non-AA >60 12/01/2021 12:10 PM    Calcium 9.5 06/13/2022 09:55 AM    Bilirubin, total 0.3 06/13/2022 09:55 AM    Alk. phosphatase 57 06/13/2022 09:55 AM    Protein, total 6.8 06/13/2022 09:55 AM    Albumin 4.3 06/13/2022 09:55 AM    Globulin 3.1 12/01/2021 12:10 PM    A-G Ratio 1.7 06/13/2022 09:55 AM    ALT (SGPT) 13 06/13/2022 09:55 AM     Lab Results   Component Value Date/Time    Hemoglobin A1c 5.6 10/31/2019 11:30 AM      Lab Results   Component Value Date/Time    Cholesterol, total 219 (H) 06/13/2022 09:55 AM    HDL Cholesterol 103 06/13/2022 09:55 AM    LDL, calculated 104 (H) 06/13/2022 09:55 AM    LDL, calculated 86.2 12/01/2021 12:10 PM    VLDL, calculated 12 06/13/2022 09:55 AM    VLDL, calculated 26.8 12/01/2021 12:10 PM    Triglyceride 67 06/13/2022 09:55 AM    CHOL/HDL Ratio 2.3 12/01/2021 12:10 PM          ASSESSMENT/PLAN  Diagnoses and all orders for this visit:    1.  Medicare annual wellness visit, subsequent    2. Hypercholesterolemia    3. Insomnia due to other mental disorder  -     zolpidem CR (Ambien CR) 6.25 mg tablet; Take 1 Tablet by mouth nightly as needed for Sleep. Max Daily Amount: 6.25 mg.    4. Anxiety    5. Supraventricular tachycardia (HCC)    Intermittent brief, fleeting spells of dizziness. Unclear if related to SVT or not. Patient states that symptoms feel similar but much less mild. They have not changed in 2 years and they are very infrequent. Continue metoprolol as prescribed and if symptoms increase in frequency, intensity, duration then she will make an appointment. Refilled Ambien CR for rare, intermittent use. If this is not covered I would switch to Ambien 5 mg nightly as needed. Anxiety controlled with Zoloft, continue. Lipids acceptable and June on statin, continue. Check yearly. Health Maintenance Due   Topic Date Due    COVID-19 Vaccine (3 - Booster for Moderna series) 05/27/2021        Follow-up and Dispositions    Return in about 6 months (around 6/6/2023) for bp, chol, anxiety. Reviewed plan of care. Patient has provided input and agrees with goals. The nurse provided the patient and/or family with advanced directive information if needed and encouraged the patient to provide a copy to the office when available. This is the Subsequent Medicare Annual Wellness Exam, performed 12 months or more after the Initial AWV or the last Subsequent AWV    I have reviewed the patient's medical history in detail and updated the computerized patient record. Assessment/Plan   Education and counseling provided:  Are appropriate based on today's review and evaluation    1. Medicare annual wellness visit, subsequent  2. Hypercholesterolemia  3. Insomnia due to other mental disorder  -     zolpidem CR (Ambien CR) 6.25 mg tablet; Take 1 Tablet by mouth nightly as needed for Sleep.  Max Daily Amount: 6.25 mg., Normal, Disp-20 Tablet, R-0  4. Anxiety  5. Supraventricular tachycardia (HCC)       Depression Risk Factor Screening     3 most recent PHQ Screens 12/6/2022   Little interest or pleasure in doing things Not at all   Feeling down, depressed, irritable, or hopeless Not at all   Total Score PHQ 2 0       Alcohol & Drug Abuse Risk Screen    Do you average more than 1 drink per night or more than 7 drinks a week:  No    On any one occasion in the past three months have you have had more than 3 drinks containing alcohol:  No          Functional Ability and Level of Safety    Hearing: Hearing is good. Activities of Daily Living: The home contains: grab bars  Patient does total self care      Ambulation: with no difficulty     Fall Risk:  Fall Risk Assessment, last 12 mths 12/6/2022   Able to walk? Yes   Fall in past 12 months? 0   Do you feel unsteady?  0   Are you worried about falling 0      Abuse Screen:  Patient is not abused       Cognitive Screening    Has your family/caregiver stated any concerns about your memory: no     Cognitive Screening: n/a    Health Maintenance Due     Health Maintenance Due   Topic Date Due    COVID-19 Vaccine (3 - Booster for Kathy Miki series) 05/27/2021       Patient Care Team   Patient Care Team:  Mitali Mcclure MD as PCP - General (Internal Medicine Physician)  Mitali Mcclure MD as PCP - [facility] Empaneled Provider    History     Patient Active Problem List   Diagnosis Code    Supraventricular tachycardia ([facility] [address] I47.1    Gastritis, chronic K29.50    Insomnia G47.00    Hypercholesterolemia E78.00    Neck pain M54.2    Anxiety F41.9    Osteopenia M85.80     Past Medical History:   Diagnosis Date    Arrhythmia     SVT    Chronic erosive gastritis     Menopause 1983    Neuropathy     Osteopenia     Postmenopausal HRT (hormone replacement therapy) 1983    Stopped in 7/2018    SCC (squamous cell carcinoma), arm 2014    left upper arm      Past Surgical History:   Procedure Laterality Date    ENDOSCOPY, COLON, DIAGNOSTIC      HX BREAST AUGMENTATION      HX HYSTERECTOMY  1983    HX OOPHORECTOMY Bilateral 1983    IMPLANT BREAST SILICONE/EQ Bilateral [id number]     Current Outpatient Medications   Medication Sig Dispense Refill    cyanocobalamin (VITAMIN B12) 100 mcg tablet Vitamin B12      ascorbic acid, vitamin C, (VITAMIN C) 500 mg tablet Vitamin C      zolpidem CR (Ambien CR) 6.25 mg tablet Take 1 Tablet by mouth nightly as needed for Sleep. Max Daily Amount: 6.25 mg. 20 Tablet 0    sertraline (ZOLOFT) 25 mg tablet TAKE 1 TABLET BY MOUTH EVERY DAY 90 Tablet 1    pravastatin (PRAVACHOL) 20 mg tablet TAKE 1 TABLET BY MOUTH AT BEDTIME 90 Tablet 3    metoprolol succinate (TOPROL-XL) 25 mg XL tablet TAKE 1/2 TABLET BY MOUTH EVERY DAY 45 Tablet 3    ergocalciferol (ERGOCALCIFEROL) 1,250 mcg (50,000 unit) capsule Take  by mouth every seven (7) days.       estradiol (ESTRACE) 0.5 mg tablet estradiol 0.5 mg tablet   1 po daily       Allergies   Allergen Reactions    Zanaflex [Tizanidine] Other (comments)     Patient denies allergy       Family History   Problem Relation Age of Onset    Cancer Father         Lung Cancer    Alcohol abuse Mother         hepatic cirrhosis    Alcohol abuse Sister     Stroke Paternal Uncle      Social History     Tobacco Use    Smoking status: Never    Smokeless tobacco: Never   Substance Use Topics    Alcohol use: No         Christie Spangler MD

## 2022-12-06 NOTE — PATIENT INSTRUCTIONS
Medicare Wellness Visit, Female     The best way to live healthy is to have a lifestyle where you eat a well-balanced diet, exercise regularly, limit alcohol use, and quit all forms of tobacco/nicotine, if applicable. Regular preventive services are another way to keep healthy. Preventive services (vaccines, screening tests, monitoring & exams) can help personalize your care plan, which helps you manage your own care. Screening tests can find health problems at the earliest stages, when they are easiest to treat. [facility] follows the current, evidence-based guidelines published by the [facility] Anand Galan ([facility]) when recommending preventive services for our patients. Because we follow these guidelines, sometimes recommendations change over time as research supports it. (For example, mammograms used to be recommended annually. Even though Medicare will still pay for an annual mammogram, the newer guidelines recommend a mammogram every two years for women of average risk). Of course, you and your doctor may decide to screen more often for some diseases, based on your risk and your co-morbidities (chronic disease you are already diagnosed with). Preventive services for you include:  - Medicare offers their members a free annual wellness visit, which is time for you and your primary care provider to discuss and plan for your preventive service needs.  Take advantage of this benefit every year!    -Over the age of 72 should receive the recommended pneumonia vaccines.    -All adults should have a flu vaccine yearly.  -All adults should have a tetanus vaccine every 10 years.   -Over the age 48 should receive the shingles vaccines.        -All adults should be screened once for Hepatitis C.  -All adults age 38-68 who are overweight should have a diabetes screening test once every three years.   -Other screening tests and preventive services for persons with diabetes include: an eye exam to screen for diabetic retinopathy, a kidney function test, a foot exam, and stricter control over your cholesterol.   -Cardiovascular screening for adults with routine risk involves an electrocardiogram (ECG) at intervals determined by your doctor.     -Colorectal cancer screenings should be done for adults age 39-70 with no increased risk factors for colorectal cancer. There are a number of acceptable methods of screening for this type of cancer. Each test has its own benefits and drawbacks. Discuss with your doctor what is most appropriate for you during your annual wellness visit. The different tests include: colonoscopy (considered the best screening method), a fecal occult blood test, a fecal DNA test, and sigmoidoscopy.    -Lung cancer screening is recommended annually with a low dose CT scan for adults between age 54 and 68, who have smoked at least 30 pack years (equivalent of 1 pack per day for 30 days), and who is a current smoker or quit less than 15 years ago.    -A bone mass density test is recommended when a woman turns 65 to screen for osteoporosis. This test is only recommended one time, as a screening. Some providers will use this same test as a disease monitoring tool if you already have osteoporosis. -Breast cancer screenings are recommended every other year for women of normal risk, age 54-69.    -Cervical cancer screenings for women over age 72 are only recommended with certain risk factors.      Here is a list of your current Health Maintenance items (your personalized list of preventive services) with a due date:  Health Maintenance Due   Topic Date Due    COVID-19 Vaccine (3 - Booster for [place]  series) 05/27/2021

## 2022-12-06 NOTE — PROGRESS NOTES
Lela Hewitt  Identified pt with two pt identifiers(name and ). Chief Complaint   Patient presents with    Annual Wellness Visit    Hypertension    Sleep Problem       Reviewed record In preparation for visit and have obtained necessary documentation. 1. Have you been to the ER, urgent care clinic or hospitalized since your last visit? No     2. Have you seen or consulted any other health care providers outside of the [address] [place] [address] since your last visit? Include any pap smears or colon screening. No    Vitals reviewed with provider.     Health Maintenance reviewed:     Health Maintenance Due   Topic    COVID-19 Vaccine (3 - Booster for Moderna series)    Medicare Yearly Exam           Wt Readings from Last 3 Encounters:   22 119 lb (54 kg)   22 117 lb 12.8 oz (53.4 kg)   22 115 lb (52.2 kg)        Temp Readings from Last 3 Encounters:   22 97.6 °F (36.4 °C) (Oral)   22 97.9 °F (36.6 °C) (Oral)   22 97.6 °F (36.4 °C) (Oral)        BP Readings from Last 3 Encounters:   22 (!) 150/82   22 130/70   22 119/72        Pulse Readings from Last 3 Encounters:   22 (!) 55   22 (!) 59   22 (!) 57        Vitals:    22 1035   BP: (!) 150/82   Pulse: (!) 55   Resp: 12   Temp: 97.6 °F (36.4 °C)   TempSrc: Oral   SpO2: 98%   Weight: 119 lb (54 kg)   Height: 5' 1\" (1.549 m)   PainSc:   0 - No pain          Learning Assessment:   :       Learning Assessment 2015   PRIMARY LEARNER Patient Patient   BARRIERS PRIMARY LEARNER - NONE   CO-LEARNER CAREGIVER - No   PRIMARY LANGUAGE ENGLISH ENGLISH    NEED - No   LEARNER PREFERENCE PRIMARY READING DEMONSTRATION   ANSWERED BY patient patient   RELATIONSHIP SELF SELF        Depression Screening:   :       3 most recent PHQ Screens 2022   Little interest or pleasure in doing things Not at all   Feeling down, depressed, irritable, or hopeless Not at all   Total Score PHQ 2 0        Fall Risk Assessment:   :       Fall Risk Assessment, last 12 mths 12/6/2022   Able to walk? Yes   Fall in past 12 months? 0   Do you feel unsteady? 0   Are you worried about falling 0        Abuse Screening:   :       Abuse Screening Questionnaire 12/6/2022 12/1/2021 11/30/2020 10/31/2019 10/30/2018 10/30/2017 7/14/2014   Do you ever feel afraid of your partner? N N N N N N N   Are you in a relationship with someone who physically or mentally threatens you? N N N N N N N   Is it safe for you to go home?  Y Y Y Y Y Y Y        ADL Screening:   :       ADL Assessment 12/6/2022   Feeding yourself No Help Needed   Getting from bed to chair No Help Needed   Getting dressed No Help Needed   Bathing or showering No Help Needed   Walk across the room (includes cane/walker) No Help Needed   Using the telphone No Help Needed   Taking your medications No Help Needed   Preparing meals No Help Needed   Managing money (expenses/bills) No Help Needed   Moderately strenuous housework (laundry) No Help Needed   Shopping for personal items (toiletries/medicines) No Help Needed   Shopping for groceries No Help Needed   Driving No Help Needed   Climbing a flight of stairs No Help Needed   Getting to places beyond walking distances No Help Needed

## 2022-12-17 DIAGNOSIS — F41.9 ANXIETY: ICD-10-CM

## 2022-12-17 RX ORDER — SERTRALINE HYDROCHLORIDE 25 MG/1
TABLET, FILM COATED ORAL
Qty: 90 TABLET | Refills: 1 | Status: SHIPPED | OUTPATIENT
Start: 2022-12-17

## 2023-02-03 ENCOUNTER — TELEPHONE (OUTPATIENT)
Dept: INTERNAL MEDICINE CLINIC | Age: 83
End: 2023-02-03

## 2023-02-03 DIAGNOSIS — F41.9 ANXIETY: ICD-10-CM

## 2023-02-03 DIAGNOSIS — E78.00 HYPERCHOLESTEROLEMIA: ICD-10-CM

## 2023-02-03 DIAGNOSIS — I47.1 SUPRAVENTRICULAR TACHYCARDIA (HCC): ICD-10-CM

## 2023-02-03 RX ORDER — SERTRALINE HYDROCHLORIDE 25 MG/1
25 TABLET, FILM COATED ORAL DAILY
Qty: 90 TABLET | Refills: 1 | Status: SHIPPED | OUTPATIENT
Start: 2023-02-03

## 2023-02-03 RX ORDER — PRAVASTATIN SODIUM 20 MG/1
TABLET ORAL
Qty: 90 TABLET | Refills: 3 | Status: SHIPPED | OUTPATIENT
Start: 2023-02-03

## 2023-02-03 RX ORDER — METOPROLOL SUCCINATE 25 MG/1
TABLET, EXTENDED RELEASE ORAL
Qty: 45 TABLET | Refills: 3 | Status: SHIPPED | OUTPATIENT
Start: 2023-02-03

## 2023-02-03 NOTE — TELEPHONE ENCOUNTER
PCP: Rigo Man MD     Last appt: 12/6/2022     Future Appointments   Date Time Provider Sharon Calzada   6/6/2023  1:15 PM Rigo Man MD [facility] AMB          Requested Prescriptions     Pending Prescriptions Disp Refills    sertraline (ZOLOFT) 25 mg tablet 90 Tablet 1     Sig: Take 1 Tablet by mouth daily. pravastatin (PRAVACHOL) 20 mg tablet 90 Tablet 3     Sig: TAKE 1 TABLET BY MOUTH AT BEDTIME    metoprolol succinate (TOPROL-XL) 25 mg XL tablet 45 Tablet 3     Sig: TAKE 1/2 TABLET BY MOUTH EVERY DAY      I called the patient and verified them by name and date of birth. I informed her that they were requesting refills. I have set them up and sent it to Dr. Kristi Espinal. She stated understanding and had no further question.

## 2023-02-03 NOTE — TELEPHONE ENCOUNTER
Pt called and stated that she is using [facility] now as her pharmacy. Pt stated that they called her about a medication and they told her to call us but she is not sure for what or even if she has any medications that are due.  [facility] number is [phone] fax: [phone]

## 2023-02-09 DIAGNOSIS — I47.1 SUPRAVENTRICULAR TACHYCARDIA (HCC): ICD-10-CM

## 2023-02-09 RX ORDER — METOPROLOL SUCCINATE 25 MG/1
TABLET, EXTENDED RELEASE ORAL
Qty: 45 TABLET | Refills: 2 | Status: SHIPPED | OUTPATIENT
Start: 2023-02-09

## 2023-03-01 NOTE — PROGRESS NOTES
Deidra Gonzalez  Identified pt with two pt identifiers(name and ). Chief Complaint   Patient presents with    Medication Evaluation     Room 2B //        Reviewed record In preparation for visit and have obtained necessary documentation. 1. Have you been to the ER, urgent care clinic or hospitalized since your last visit? No     2. Have you seen or consulted any other health care providers outside of the [address] [place] [address] since your last visit? Include any pap smears or colon screening. No    Patient has an advance directive. Vitals reviewed with provider.     Health Maintenance reviewed:     Health Maintenance Due   Topic    COVID-19 Vaccine (3 - Booster for Moderna series)        Wt Readings from Last 3 Encounters:   22 117 lb 12.8 oz (53.4 kg)   22 115 lb (52.2 kg)   21 116 lb 3.2 oz (52.7 kg)      Temp Readings from Last 3 Encounters:   22 97.6 °F (36.4 °C) (Oral)   21 97.8 °F (36.6 °C) (Oral)   20 98.2 °F (36.8 °C) (Oral)      BP Readings from Last 3 Encounters:   22 119/72   21 120/80   20 127/70      Pulse Readings from Last 3 Encounters:   22 (!) 57   21 (!) 58   20 (!) 56      Vitals:    22 1134   Resp: 16   Weight: 117 lb 12.8 oz (53.4 kg)   Height: 5' 1\" (1.549 m)   PainSc:   0 - No pain          Learning Assessment:   :     Learning Assessment 2015   PRIMARY LEARNER Patient Patient   BARRIERS PRIMARY LEARNER - NONE   CO-LEARNER CAREGIVER - No   PRIMARY LANGUAGE ENGLISH ENGLISH    NEED - No   LEARNER PREFERENCE PRIMARY READING DEMONSTRATION   ANSWERED BY patient patient   RELATIONSHIP SELF SELF        Depression Screening:   :     3 most recent PHQ Screens 3/1/2022   Little interest or pleasure in doing things Not at all   Feeling down, depressed, irritable, or hopeless Not at all   Total Score PHQ 2 0        Fall Risk Assessment:   :     Fall Risk Assessment, last 12 mths This RN rtn call and identity verified by  and last four digits of SSN.   Pt reports vaginal concerns as listed below. New appt scheduled for self swab for swabone and STI. Pt verb understanding of plan .    12/1/2021   Able to walk? Yes   Fall in past 12 months? 0   Do you feel unsteady? 0   Are you worried about falling 0        Abuse Screening:   :     Abuse Screening Questionnaire 12/1/2021 11/30/2020 10/31/2019 10/30/2018 10/30/2017 7/14/2014   Do you ever feel afraid of your partner? N N N N N N   Are you in a relationship with someone who physically or mentally threatens you? N N N N N N   Is it safe for you to go home?  Y Y Y Y Y Y        ADL Screening:   :     ADL Assessment 12/1/2021   Feeding yourself No Help Needed   Getting from bed to chair No Help Needed   Getting dressed No Help Needed   Bathing or showering No Help Needed   Walk across the room (includes cane/walker) No Help Needed   Using the telphone No Help Needed   Taking your medications No Help Needed   Preparing meals No Help Needed   Managing money (expenses/bills) No Help Needed   Moderately strenuous housework (laundry) No Help Needed   Shopping for personal items (toiletries/medicines) No Help Needed   Shopping for groceries No Help Needed   Driving No Help Needed   Climbing a flight of stairs No Help Needed   Getting to places beyond walking distances No Help Needed

## 2023-03-15 DIAGNOSIS — E78.00 HYPERCHOLESTEROLEMIA: ICD-10-CM

## 2023-03-15 RX ORDER — PRAVASTATIN SODIUM 20 MG/1
TABLET ORAL
Qty: 90 TABLET | Refills: 2 | Status: SHIPPED | OUTPATIENT
Start: 2023-03-15

## 2023-05-17 ENCOUNTER — TELEPHONE (OUTPATIENT)
Facility: CLINIC | Age: 83
End: 2023-05-17

## 2023-05-17 NOTE — TELEPHONE ENCOUNTER
Pt is calling bc she has a cold/sinus infection. She took some of her husbands imoxicillan and he only had 4 pills left and she said she needs more bc it didn't completely get rid of everything.

## 2023-06-06 ENCOUNTER — OFFICE VISIT (OUTPATIENT)
Facility: CLINIC | Age: 83
End: 2023-06-06
Payer: MEDICARE

## 2023-06-06 VITALS
RESPIRATION RATE: 12 BRPM | SYSTOLIC BLOOD PRESSURE: 122 MMHG | HEIGHT: 60 IN | WEIGHT: 120 LBS | OXYGEN SATURATION: 97 % | DIASTOLIC BLOOD PRESSURE: 77 MMHG | BODY MASS INDEX: 23.56 KG/M2 | HEART RATE: 70 BPM | TEMPERATURE: 97.6 F

## 2023-06-06 DIAGNOSIS — I47.1 SUPRAVENTRICULAR TACHYCARDIA (HCC): ICD-10-CM

## 2023-06-06 DIAGNOSIS — G47.00 INSOMNIA, UNSPECIFIED TYPE: ICD-10-CM

## 2023-06-06 DIAGNOSIS — E78.00 HYPERCHOLESTEROLEMIA: ICD-10-CM

## 2023-06-06 DIAGNOSIS — E55.9 VITAMIN D DEFICIENCY: ICD-10-CM

## 2023-06-06 DIAGNOSIS — R42 DIZZINESS: ICD-10-CM

## 2023-06-06 DIAGNOSIS — F41.9 ANXIETY: Primary | ICD-10-CM

## 2023-06-06 DIAGNOSIS — F41.9 ANXIETY DISORDER, UNSPECIFIED TYPE: ICD-10-CM

## 2023-06-06 PROCEDURE — 99214 OFFICE O/P EST MOD 30 MIN: CPT | Performed by: INTERNAL MEDICINE

## 2023-06-06 PROCEDURE — G8400 PT W/DXA NO RESULTS DOC: HCPCS | Performed by: INTERNAL MEDICINE

## 2023-06-06 PROCEDURE — 1036F TOBACCO NON-USER: CPT | Performed by: INTERNAL MEDICINE

## 2023-06-06 PROCEDURE — G8427 DOCREV CUR MEDS BY ELIG CLIN: HCPCS | Performed by: INTERNAL MEDICINE

## 2023-06-06 PROCEDURE — 1123F ACP DISCUSS/DSCN MKR DOCD: CPT | Performed by: INTERNAL MEDICINE

## 2023-06-06 PROCEDURE — G8420 CALC BMI NORM PARAMETERS: HCPCS | Performed by: INTERNAL MEDICINE

## 2023-06-06 PROCEDURE — 1090F PRES/ABSN URINE INCON ASSESS: CPT | Performed by: INTERNAL MEDICINE

## 2023-06-06 SDOH — ECONOMIC STABILITY: INCOME INSECURITY: HOW HARD IS IT FOR YOU TO PAY FOR THE VERY BASICS LIKE FOOD, HOUSING, MEDICAL CARE, AND HEATING?: NOT VERY HARD

## 2023-06-06 SDOH — ECONOMIC STABILITY: HOUSING INSECURITY
IN THE LAST 12 MONTHS, WAS THERE A TIME WHEN YOU DID NOT HAVE A STEADY PLACE TO SLEEP OR SLEPT IN A SHELTER (INCLUDING NOW)?: NO

## 2023-06-06 SDOH — ECONOMIC STABILITY: FOOD INSECURITY: WITHIN THE PAST 12 MONTHS, YOU WORRIED THAT YOUR FOOD WOULD RUN OUT BEFORE YOU GOT MONEY TO BUY MORE.: NEVER TRUE

## 2023-06-06 SDOH — ECONOMIC STABILITY: FOOD INSECURITY: WITHIN THE PAST 12 MONTHS, THE FOOD YOU BOUGHT JUST DIDN'T LAST AND YOU DIDN'T HAVE MONEY TO GET MORE.: NEVER TRUE

## 2023-06-06 ASSESSMENT — PATIENT HEALTH QUESTIONNAIRE - PHQ9
2. FEELING DOWN, DEPRESSED OR HOPELESS: 0
SUM OF ALL RESPONSES TO PHQ QUESTIONS 1-9: 0
SUM OF ALL RESPONSES TO PHQ9 QUESTIONS 1 & 2: 0
1. LITTLE INTEREST OR PLEASURE IN DOING THINGS: 0

## 2023-06-06 NOTE — PROGRESS NOTES
HPI  Ms. Geri Prince is a [de-identified]y.o. year old female, she is seen today for follow up HTN, cholesterol, anxiety. Plan 6 mos ago:  Intermittent brief, fleeting spells of dizziness. Unclear if related to SVT or not. Patient states that symptoms feel similar but much less mild. They have not changed in 2 years and they are very infrequent. Continue metoprolol as prescribed and if symptoms increase in frequency, intensity, duration then she will make an appointment. Refilled Ambien CR for rare, intermittent use. If this is not covered I would switch to Ambien 5 mg nightly as needed. Anxiety controlled with Zoloft, continue. Lipids acceptable and June on statin, continue. Check yearly. Reports she continues to have intermittent fleeting spells of dizziness - ie walking across the room - similar to spells she has been having for about 2-3 years with no recent cardiology evaluation. No chest pain or sob. Has been been sleeping well without ambien - hasn't needed since last visit. Anxiety is well controlled with zoloft. Not feeling unusually sad, down or depressed or anxious. Appetite is good. Checks BP and is \"good\" - highest in 661P systolic. No n/v/abd pain, heartburn/indigestion, blood in stool or melena. 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 Chief Complaint   Patient presents with    Hypertension    Cholesterol Problem    Anxiety        Prior to Admission medications    Medication Sig Start Date End Date Taking?  Authorizing Provider   Acetaminophen (TYLENOL ARTHRITIS PAIN PO) Tylenol Arthritis Pain   q12h   Yes Historical Provider, MD   ascorbic acid (VITAMIN C) 500 MG tablet Vitamin C   Yes Ar Automatic Reconciliation   cyanocobalamin 100 MCG tablet Vitamin B12   Yes Ar Automatic Reconciliation   ergocalciferol (ERGOCALCIFEROL) 1.25 MG (71997 UT) capsule Take by mouth every 7
Help Needed   Driving No Help Needed   Climbing a flight of stairs No Help Needed   Getting to places beyond walking distances No Help Needed

## 2023-06-08 ENCOUNTER — TELEPHONE (OUTPATIENT)
Age: 83
End: 2023-06-08

## 2023-06-08 ENCOUNTER — OFFICE VISIT (OUTPATIENT)
Age: 83
End: 2023-06-08
Payer: MEDICARE

## 2023-06-08 VITALS
RESPIRATION RATE: 16 BRPM | SYSTOLIC BLOOD PRESSURE: 138 MMHG | BODY MASS INDEX: 22.09 KG/M2 | HEIGHT: 61 IN | DIASTOLIC BLOOD PRESSURE: 80 MMHG | OXYGEN SATURATION: 98 % | WEIGHT: 117 LBS | HEART RATE: 78 BPM

## 2023-06-08 DIAGNOSIS — E78.00 HYPERCHOLESTEROLEMIA: ICD-10-CM

## 2023-06-08 DIAGNOSIS — R26.89 LOSS OF BALANCE: ICD-10-CM

## 2023-06-08 DIAGNOSIS — I47.1 SUPRAVENTRICULAR TACHYCARDIA (HCC): Primary | ICD-10-CM

## 2023-06-08 DIAGNOSIS — R06.02 SOB (SHORTNESS OF BREATH): ICD-10-CM

## 2023-06-08 PROCEDURE — 93005 ELECTROCARDIOGRAM TRACING: CPT | Performed by: SPECIALIST

## 2023-06-08 PROCEDURE — 99214 OFFICE O/P EST MOD 30 MIN: CPT | Performed by: SPECIALIST

## 2023-06-08 ASSESSMENT — PATIENT HEALTH QUESTIONNAIRE - PHQ9
SUM OF ALL RESPONSES TO PHQ QUESTIONS 1-9: 0
SUM OF ALL RESPONSES TO PHQ9 QUESTIONS 1 & 2: 0
1. LITTLE INTEREST OR PLEASURE IN DOING THINGS: 0
2. FEELING DOWN, DEPRESSED OR HOPELESS: 0

## 2023-06-08 NOTE — TELEPHONE ENCOUNTER
----- Message from Keli Abernathy RN sent at 6/8/2023 11:56 AM EDT -----  Please order this young lady a 7 day holter for SVT, thank you!

## 2023-06-09 DIAGNOSIS — R42 DIZZINESS: ICD-10-CM

## 2023-06-09 DIAGNOSIS — E78.00 HYPERCHOLESTEROLEMIA: ICD-10-CM

## 2023-06-09 DIAGNOSIS — F41.9 ANXIETY: ICD-10-CM

## 2023-06-09 DIAGNOSIS — E55.9 VITAMIN D DEFICIENCY: ICD-10-CM

## 2023-06-09 LAB
25(OH)D3 SERPL-MCNC: 88.9 NG/ML (ref 30–100)
ALBUMIN SERPL-MCNC: 3.5 G/DL (ref 3.5–5)
ALBUMIN/GLOB SERPL: 1.1 (ref 1.1–2.2)
ALP SERPL-CCNC: 64 U/L (ref 45–117)
ALT SERPL-CCNC: 24 U/L (ref 12–78)
ANION GAP SERPL CALC-SCNC: 5 MMOL/L (ref 5–15)
AST SERPL-CCNC: 32 U/L (ref 15–37)
BASOPHILS # BLD: 0.1 K/UL (ref 0–0.1)
BASOPHILS NFR BLD: 1 % (ref 0–1)
BILIRUB SERPL-MCNC: 0.4 MG/DL (ref 0.2–1)
BUN SERPL-MCNC: 17 MG/DL (ref 6–20)
BUN/CREAT SERPL: 19 (ref 12–20)
CALCIUM SERPL-MCNC: 8.9 MG/DL (ref 8.5–10.1)
CHLORIDE SERPL-SCNC: 109 MMOL/L (ref 97–108)
CHOLEST SERPL-MCNC: 236 MG/DL
CO2 SERPL-SCNC: 26 MMOL/L (ref 21–32)
CREAT SERPL-MCNC: 0.9 MG/DL (ref 0.55–1.02)
DIFFERENTIAL METHOD BLD: NORMAL
EOSINOPHIL # BLD: 0.1 K/UL (ref 0–0.4)
EOSINOPHIL NFR BLD: 2 % (ref 0–7)
ERYTHROCYTE [DISTWIDTH] IN BLOOD BY AUTOMATED COUNT: 13.5 % (ref 11.5–14.5)
GLOBULIN SER CALC-MCNC: 3.3 G/DL (ref 2–4)
GLUCOSE SERPL-MCNC: 98 MG/DL (ref 65–100)
HCT VFR BLD AUTO: 39 % (ref 35–47)
HDLC SERPL-MCNC: 93 MG/DL
HDLC SERPL: 2.5 (ref 0–5)
HGB BLD-MCNC: 12.1 G/DL (ref 11.5–16)
IMM GRANULOCYTES # BLD AUTO: 0 K/UL (ref 0–0.04)
IMM GRANULOCYTES NFR BLD AUTO: 0 % (ref 0–0.5)
LDLC SERPL CALC-MCNC: 125 MG/DL (ref 0–100)
LYMPHOCYTES # BLD: 1.1 K/UL (ref 0.8–3.5)
LYMPHOCYTES NFR BLD: 24 % (ref 12–49)
MCH RBC QN AUTO: 29.3 PG (ref 26–34)
MCHC RBC AUTO-ENTMCNC: 31 G/DL (ref 30–36.5)
MCV RBC AUTO: 94.4 FL (ref 80–99)
MONOCYTES # BLD: 0.4 K/UL (ref 0–1)
MONOCYTES NFR BLD: 10 % (ref 5–13)
NEUTS SEG # BLD: 2.8 K/UL (ref 1.8–8)
NEUTS SEG NFR BLD: 63 % (ref 32–75)
NRBC # BLD: 0 K/UL (ref 0–0.01)
NRBC BLD-RTO: 0 PER 100 WBC
PLATELET # BLD AUTO: 290 K/UL (ref 150–400)
PMV BLD AUTO: 10.1 FL (ref 8.9–12.9)
POTASSIUM SERPL-SCNC: 4.6 MMOL/L (ref 3.5–5.1)
PROT SERPL-MCNC: 6.8 G/DL (ref 6.4–8.2)
RBC # BLD AUTO: 4.13 M/UL (ref 3.8–5.2)
SODIUM SERPL-SCNC: 140 MMOL/L (ref 136–145)
TRIGL SERPL-MCNC: 90 MG/DL
VLDLC SERPL CALC-MCNC: 18 MG/DL
WBC # BLD AUTO: 4.6 K/UL (ref 3.6–11)

## 2023-06-11 LAB — TSH SERPL DL<=0.05 MIU/L-ACNC: 3.7 UIU/ML (ref 0.45–4.5)

## 2023-07-19 RX ORDER — SERTRALINE HYDROCHLORIDE 25 MG/1
TABLET, FILM COATED ORAL
Qty: 90 TABLET | Refills: 1 | Status: SHIPPED | OUTPATIENT
Start: 2023-07-19

## 2023-07-19 NOTE — TELEPHONE ENCOUNTER
PCP: Graciela Tejeda MD     Last appt:  6/6/2023      Future Appointments   Date Time Provider [address]  [facility]   8/10/2023 11:40 AM MD JESSICA Siegel AMB   12/6/2023  2:30 PM MD LASHONDA Card AMB          Requested Prescriptions     Pending Prescriptions Disp Refills    sertraline (ZOLOFT) 25 MG tablet [Pharmacy Med Name: SERTRALINE TAB 25MG] 90 tablet 1     Sig: TAKE 1 TABLET DAILY

## 2023-07-24 ENCOUNTER — ANCILLARY PROCEDURE (OUTPATIENT)
Age: 83
End: 2023-07-24
Payer: MEDICARE

## 2023-07-24 VITALS
DIASTOLIC BLOOD PRESSURE: 80 MMHG | WEIGHT: 117 LBS | HEIGHT: 61 IN | SYSTOLIC BLOOD PRESSURE: 138 MMHG | BODY MASS INDEX: 22.09 KG/M2

## 2023-07-24 DIAGNOSIS — I47.1 SUPRAVENTRICULAR TACHYCARDIA (HCC): ICD-10-CM

## 2023-07-24 DIAGNOSIS — R06.02 SOB (SHORTNESS OF BREATH): ICD-10-CM

## 2023-07-24 PROCEDURE — 93306 TTE W/DOPPLER COMPLETE: CPT

## 2023-07-31 LAB
ECHO AO ASC DIAM: 3.5 CM
ECHO AO ASCENDING AORTA INDEX: 2.33 CM/M2
ECHO AO ROOT DIAM: 2.9 CM
ECHO AO ROOT INDEX: 1.93 CM/M2
ECHO AV AREA PEAK VELOCITY: 2.7 CM2
ECHO AV AREA VTI: 2.9 CM2
ECHO AV AREA/BSA PEAK VELOCITY: 1.8 CM2/M2
ECHO AV AREA/BSA VTI: 1.9 CM2/M2
ECHO AV MEAN GRADIENT: 4 MMHG
ECHO AV MEAN VELOCITY: 0.9 M/S
ECHO AV PEAK GRADIENT: 7 MMHG
ECHO AV PEAK VELOCITY: 1.3 M/S
ECHO AV VELOCITY RATIO: 0.85
ECHO AV VTI: 31.3 CM
ECHO BSA: 1.51 M2
ECHO EST RA PRESSURE: 3 MMHG
ECHO LA DIAMETER INDEX: 2.07 CM/M2
ECHO LA DIAMETER: 3.1 CM
ECHO LA TO AORTIC ROOT RATIO: 1.07
ECHO LA VOL 2C: 48 ML (ref 22–52)
ECHO LA VOL 4C: 46 ML (ref 22–52)
ECHO LA VOL BP: 48 ML (ref 22–52)
ECHO LA VOL/BSA BIPLANE: 32 ML/M2 (ref 16–34)
ECHO LA VOLUME AREA LENGTH: 51 ML
ECHO LA VOLUME INDEX A2C: 32 ML/M2 (ref 16–34)
ECHO LA VOLUME INDEX A4C: 31 ML/M2 (ref 16–34)
ECHO LA VOLUME INDEX AREA LENGTH: 34 ML/M2 (ref 16–34)
ECHO LV E' LATERAL VELOCITY: 7 CM/S
ECHO LV E' SEPTAL VELOCITY: 7 CM/S
ECHO LV EJECTION FRACTION A2C: 64 %
ECHO LV EJECTION FRACTION A4C: 59 %
ECHO LV FRACTIONAL SHORTENING: 38 % (ref 28–44)
ECHO LV INTERNAL DIMENSION DIASTOLE INDEX: 2.67 CM/M2
ECHO LV INTERNAL DIMENSION DIASTOLIC: 4 CM (ref 3.9–5.3)
ECHO LV INTERNAL DIMENSION SYSTOLIC INDEX: 1.67 CM/M2
ECHO LV INTERNAL DIMENSION SYSTOLIC: 2.5 CM
ECHO LV IVSD: 1.1 CM (ref 0.6–0.9)
ECHO LV MASS 2D: 145.6 G (ref 67–162)
ECHO LV MASS INDEX 2D: 97.1 G/M2 (ref 43–95)
ECHO LV POSTERIOR WALL DIASTOLIC: 1.1 CM (ref 0.6–0.9)
ECHO LV RELATIVE WALL THICKNESS RATIO: 0.55
ECHO LVOT AREA: 3.1 CM2
ECHO LVOT AV VTI INDEX: 0.93
ECHO LVOT DIAM: 2 CM
ECHO LVOT MEAN GRADIENT: 3 MMHG
ECHO LVOT PEAK GRADIENT: 5 MMHG
ECHO LVOT PEAK VELOCITY: 1.1 M/S
ECHO LVOT STROKE VOLUME INDEX: 60.9 ML/M2
ECHO LVOT SV: 91.4 ML
ECHO LVOT VTI: 29.1 CM
ECHO MV A VELOCITY: 0.96 M/S
ECHO MV E DECELERATION TIME (DT): 248.5 MS
ECHO MV E VELOCITY: 0.99 M/S
ECHO MV E/A RATIO: 1.03
ECHO MV E/E' LATERAL: 14.14
ECHO MV E/E' RATIO (AVERAGED): 14.14
ECHO MV E/E' SEPTAL: 14.14
ECHO RIGHT VENTRICULAR SYSTOLIC PRESSURE (RVSP): 27 MMHG
ECHO RV BASAL DIMENSION: 2.8 CM
ECHO RV FREE WALL PEAK S': 11 CM/S
ECHO RV TAPSE: 2.3 CM (ref 1.7–?)
ECHO TV REGURGITANT MAX VELOCITY: 2.46 M/S
ECHO TV REGURGITANT PEAK GRADIENT: 24 MMHG

## 2023-08-02 NOTE — RESULT ENCOUNTER NOTE
Echo shows good EF, some calcium , not unusual for age, no significant abnormalities  Holter has looked good too  Pt coming in this week to review tests and symptoms  Future Appointments  8/3/2023   10:40 AM   Berline Dubin, MD [facility] AMB  12/6/2023  2:30 PM    MD LASHONDA Torres               BS AMB

## 2023-08-03 ENCOUNTER — OFFICE VISIT (OUTPATIENT)
Age: 83
End: 2023-08-03
Payer: MEDICARE

## 2023-08-03 VITALS
BODY MASS INDEX: 22.43 KG/M2 | OXYGEN SATURATION: 99 % | WEIGHT: 118.8 LBS | DIASTOLIC BLOOD PRESSURE: 72 MMHG | HEART RATE: 58 BPM | RESPIRATION RATE: 16 BRPM | HEIGHT: 61 IN | SYSTOLIC BLOOD PRESSURE: 110 MMHG

## 2023-08-03 DIAGNOSIS — R06.02 SOB (SHORTNESS OF BREATH): ICD-10-CM

## 2023-08-03 DIAGNOSIS — E78.00 HYPERCHOLESTEROLEMIA: ICD-10-CM

## 2023-08-03 DIAGNOSIS — I47.1 SUPRAVENTRICULAR TACHYCARDIA (HCC): Primary | ICD-10-CM

## 2023-08-03 PROCEDURE — 99213 OFFICE O/P EST LOW 20 MIN: CPT | Performed by: SPECIALIST

## 2023-08-03 RX ORDER — METOPROLOL SUCCINATE 25 MG/1
12.5 TABLET, EXTENDED RELEASE ORAL PRN
Qty: 30 TABLET | Refills: 3
Start: 2023-08-03

## 2023-12-04 DIAGNOSIS — E78.00 PURE HYPERCHOLESTEROLEMIA, UNSPECIFIED: ICD-10-CM

## 2023-12-04 RX ORDER — PRAVASTATIN SODIUM 20 MG
20 TABLET ORAL
Qty: 90 TABLET | Refills: 2 | Status: SHIPPED | OUTPATIENT
Start: 2023-12-04

## 2023-12-04 NOTE — TELEPHONE ENCOUNTER
PCP: Carmella Maynard MD     Last appt:  6/6/2023      Future Appointments   Date Time Provider [address]   2/1/2024 11:00 AM Carmella Maynard MD [facility] AMB          Requested Prescriptions     Pending Prescriptions Disp Refills    pravastatin (PRAVACHOL) 20 MG tablet [Pharmacy Med Name: PRAVASTATIN SODIUM 20 MG TAB] 90 tablet 2     Sig: TAKE 1 TABLET BY MOUTH EVERYDAY AT BEDTIME

## 2023-12-10 RX ORDER — SERTRALINE HYDROCHLORIDE 25 MG/1
TABLET, FILM COATED ORAL
Qty: 90 TABLET | Refills: 1 | Status: SHIPPED | OUTPATIENT
Start: 2023-12-10

## 2024-02-01 ENCOUNTER — OFFICE VISIT (OUTPATIENT)
Facility: CLINIC | Age: 84
End: 2024-02-01
Payer: MEDICARE

## 2024-02-01 VITALS
DIASTOLIC BLOOD PRESSURE: 70 MMHG | SYSTOLIC BLOOD PRESSURE: 112 MMHG | WEIGHT: 121.5 LBS | BODY MASS INDEX: 22.94 KG/M2 | RESPIRATION RATE: 12 BRPM | HEART RATE: 69 BPM | OXYGEN SATURATION: 97 % | HEIGHT: 61 IN | TEMPERATURE: 97.7 F

## 2024-02-01 DIAGNOSIS — Z00.00 MEDICARE ANNUAL WELLNESS VISIT, SUBSEQUENT: Primary | ICD-10-CM

## 2024-02-01 DIAGNOSIS — F41.9 ANXIETY: ICD-10-CM

## 2024-02-01 DIAGNOSIS — E78.00 HYPERCHOLESTEROLEMIA: ICD-10-CM

## 2024-02-01 DIAGNOSIS — I47.10 SUPRAVENTRICULAR TACHYCARDIA: ICD-10-CM

## 2024-02-01 DIAGNOSIS — F51.01 PRIMARY INSOMNIA: ICD-10-CM

## 2024-02-01 DIAGNOSIS — E55.9 VITAMIN D DEFICIENCY: ICD-10-CM

## 2024-02-01 DIAGNOSIS — I47.10 SVT (SUPRAVENTRICULAR TACHYCARDIA): ICD-10-CM

## 2024-02-01 DIAGNOSIS — E78.00 PURE HYPERCHOLESTEROLEMIA, UNSPECIFIED: ICD-10-CM

## 2024-02-01 PROCEDURE — G8420 CALC BMI NORM PARAMETERS: HCPCS | Performed by: INTERNAL MEDICINE

## 2024-02-01 PROCEDURE — G0439 PPPS, SUBSEQ VISIT: HCPCS | Performed by: INTERNAL MEDICINE

## 2024-02-01 PROCEDURE — G8400 PT W/DXA NO RESULTS DOC: HCPCS | Performed by: INTERNAL MEDICINE

## 2024-02-01 PROCEDURE — 1123F ACP DISCUSS/DSCN MKR DOCD: CPT | Performed by: INTERNAL MEDICINE

## 2024-02-01 PROCEDURE — G8427 DOCREV CUR MEDS BY ELIG CLIN: HCPCS | Performed by: INTERNAL MEDICINE

## 2024-02-01 PROCEDURE — 1036F TOBACCO NON-USER: CPT | Performed by: INTERNAL MEDICINE

## 2024-02-01 PROCEDURE — 1090F PRES/ABSN URINE INCON ASSESS: CPT | Performed by: INTERNAL MEDICINE

## 2024-02-01 PROCEDURE — 99214 OFFICE O/P EST MOD 30 MIN: CPT | Performed by: INTERNAL MEDICINE

## 2024-02-01 PROCEDURE — G8484 FLU IMMUNIZE NO ADMIN: HCPCS | Performed by: INTERNAL MEDICINE

## 2024-02-01 RX ORDER — PRAVASTATIN SODIUM 20 MG
20 TABLET ORAL
Qty: 90 TABLET | Refills: 2 | Status: SHIPPED | OUTPATIENT
Start: 2024-02-01

## 2024-02-01 RX ORDER — SERTRALINE HYDROCHLORIDE 25 MG/1
25 TABLET, FILM COATED ORAL DAILY
Qty: 90 TABLET | Refills: 2 | Status: SHIPPED | OUTPATIENT
Start: 2024-02-01

## 2024-02-01 RX ORDER — METOPROLOL SUCCINATE 25 MG/1
12.5 TABLET, EXTENDED RELEASE ORAL PRN
Qty: 90 TABLET | Refills: 1 | Status: SHIPPED | OUTPATIENT
Start: 2024-02-01

## 2024-02-01 RX ORDER — OLOPATADINE HYDROCHLORIDE 2 MG/ML
1 SOLUTION/ DROPS OPHTHALMIC DAILY
COMMUNITY

## 2024-02-01 ASSESSMENT — LIFESTYLE VARIABLES
HOW MANY STANDARD DRINKS CONTAINING ALCOHOL DO YOU HAVE ON A TYPICAL DAY: PATIENT DOES NOT DRINK
HOW OFTEN DO YOU HAVE A DRINK CONTAINING ALCOHOL: NEVER

## 2024-02-01 ASSESSMENT — PATIENT HEALTH QUESTIONNAIRE - PHQ9
SUM OF ALL RESPONSES TO PHQ9 QUESTIONS 1 & 2: 0
1. LITTLE INTEREST OR PLEASURE IN DOING THINGS: 0
SUM OF ALL RESPONSES TO PHQ QUESTIONS 1-9: 0
2. FEELING DOWN, DEPRESSED OR HOPELESS: 0
SUM OF ALL RESPONSES TO PHQ QUESTIONS 1-9: 0

## 2024-02-01 NOTE — PROGRESS NOTES
Tracy Pulliam  Identified pt with two pt identifiers(name and ).     Chief Complaint   Patient presents with    Medicare AWV       Reviewed record In preparation for visit and have obtained necessary documentation.     1. Have you been to the ER, urgent care clinic or hospitalized since your last visit? No     2. Have you seen or consulted any other health care providers outside of the [facility] since your last visit? Include any pap smears or colon screening. No    Vitals reviewed with provider.    Health Maintenance reviewed:     Health Maintenance Due   Topic    Respiratory Syncytial Virus (RSV) Pregnant or age 60 yrs+ (1 - 1-dose 60+ series)    COVID-19 Vaccine (3 - 2023-24 season)    Annual Wellness Visit (Medicare)           Wt Readings from Last 3 Encounters:   23 53.9 kg (118 lb 12.8 oz)   23 53.1 kg (117 lb)   23 53.1 kg (117 lb)        Temp Readings from Last 3 Encounters:   23 97.6 °F (36.4 °C) (Oral)        BP Readings from Last 3 Encounters:   23 110/72   23 138/80   23 138/80        Pulse Readings from Last 3 Encounters:   23 58   23 78   23 70      [unfilled]       Learning Assessment:   :         2023     1:00 PM   [facility] AMB LEARNING ASSESSMENT   Primary Learner Patient   level of education GRADUATED HIGH SCHOOL OR GED   Barriers Factors NONE   co-learner caregiver No   Primary Language ENGLISH   Learning Preference DEMONSTRATION   Answered By patient   Relationship to Learner SELF         Fall Risk Assessment:   :         2023    11:29 AM 2022    10:36 AM 2021    10:00 AM   Amb Fall Risk Assessment and TUG Test   Do you feel unsteady or are you worried about falling?  no     2 or more falls in past year? no     Fall with injury in past year? no     Fall in past 12 months?  0 0   Able to walk?  Yes Yes          Abuse Screening:   :         2023     6:00 PM   AMB Abuse Screening   Do you ever feel

## 2024-02-01 NOTE — PROGRESS NOTES
Medicare Annual Wellness Visit    Tracy Pulliam is here for Medicare AWV and Cholesterol Problem    Assessment & Plan   Medicare annual wellness visit, subsequent  Pure hypercholesterolemia, unspecified  -     pravastatin (PRAVACHOL) 20 MG tablet; Take 1 tablet by mouth nightly, Disp-90 tablet, R-2Normal  -     Comprehensive Metabolic Panel  -     Lipid Panel  Supraventricular tachycardia  SVT (supraventricular tachycardia)  Comments:  Dr. Harp - last seen 8/2023 and told she could take metoprolol prn  Orders:  -     metoprolol succinate (TOPROL XL) 25 MG extended release tablet; Take 0.5 tablets by mouth as needed (tachycardia), Disp-90 tablet, R-1Normal  Hypercholesterolemia  Primary insomnia  Vitamin D deficiency  -     Vitamin D 25 Hydroxy  Anxiety  Blood pressure is at goal without medications.  She may take metoprolol as needed for symptoms of SVT and I sent a refill for her.  Lipids were slightly higher last check, check labs today.  She is been taking high-dose vitamin D and I will check a vitamin D.  Has been low in the past.  Anxiety controlled with sertraline, continue.  Insomnia controlled with rare use Ambien, continue.  Recommendations for Preventive Services Due: see orders and patient instructions/AVS.  Recommended screening schedule for the next 5-10 years is provided to the patient in written form: see Patient Instructions/AVS.     Return in about 6 months (around 8/1/2024) for HTN.     Subjective   The following acute and/or chronic problems were also addressed today:  Plan last visit:  With SVT and symptoms of intermittent dizziness with no recent cardiac evaluation at least since 2015 refer to cardiology.  Continue metoprolol.  Check CBC, CMP to rule out anemia and electrolyte disorder.  Due for lipids on pravastatin, get labs.  Anxiety controlled with sertraline daily.  Ambien for rare insomnia.  Has not taken in months.  She is taking vitamin D3 50,000 IUs weekly-get vitamin D

## 2024-02-01 NOTE — PATIENT INSTRUCTIONS
Learning About Being Active as an Older Adult  Why is being active important as you get older?     Being active is one of the best things you can do for your health. And it's never too late to start. Being active--or getting active, if you aren't already--has definite benefits. It can:  Give you more energy,  Keep your mind sharp.  Improve balance to reduce your risk of falls.  Help you manage chronic illness with fewer medicines.  No matter how old you are, how fit you are, or what health problems you have, there is a form of activity that will work for you. And the more physical activity you can do, the better your overall health will be.  What kinds of activity can help you stay healthy?  Being more active will make your daily activities easier. Physical activity includes planned exercise and things you do in daily life. There are four types of activity:  Aerobic.  Doing aerobic activity makes your heart and lungs strong.  Includes walking, dancing, and gardening.  Aim for at least 2½ hours spread throughout the week.  It improves your energy and can help you sleep better.  Muscle-strengthening.  This type of activity can help maintain muscle and strengthen bones.  Includes climbing stairs, using resistance bands, and lifting or carrying heavy loads.  Aim for at least twice a week.  It can help protect the knees and other joints.  Stretching.  Stretching gives you better range of motion in joints and muscles.  Includes upper arm stretches, calf stretches, and gentle yoga.  Aim for at least twice a week, preferably after your muscles are warmed up from other activities.  It can help you function better in daily life.  Balancing.  This helps you stay coordinated and have good posture.  Includes heel-to-toe walking, moises chi, and certain types of yoga.  Aim for at least 3 days a week.  It can reduce your risk of falling.  Even if you have a hard time meeting the recommendations, it's better to be more active

## 2024-02-02 LAB
25(OH)D3 SERPL-MCNC: 93.9 NG/ML (ref 30–100)
ALBUMIN SERPL-MCNC: 3.4 G/DL (ref 3.5–5)
ALBUMIN/GLOB SERPL: 1.1 (ref 1.1–2.2)
ALP SERPL-CCNC: 56 U/L (ref 45–117)
ALT SERPL-CCNC: 36 U/L (ref 12–78)
ANION GAP SERPL CALC-SCNC: 2 MMOL/L (ref 5–15)
AST SERPL-CCNC: 41 U/L (ref 15–37)
BILIRUB SERPL-MCNC: 0.3 MG/DL (ref 0.2–1)
BUN SERPL-MCNC: 18 MG/DL (ref 6–20)
BUN/CREAT SERPL: 23 (ref 12–20)
CALCIUM SERPL-MCNC: 9 MG/DL (ref 8.5–10.1)
CHLORIDE SERPL-SCNC: 108 MMOL/L (ref 97–108)
CHOLEST SERPL-MCNC: 208 MG/DL
CO2 SERPL-SCNC: 29 MMOL/L (ref 21–32)
CREAT SERPL-MCNC: 0.79 MG/DL (ref 0.55–1.02)
GLOBULIN SER CALC-MCNC: 3 G/DL (ref 2–4)
GLUCOSE SERPL-MCNC: 96 MG/DL (ref 65–100)
HDLC SERPL-MCNC: 86 MG/DL
HDLC SERPL: 2.4 (ref 0–5)
LDLC SERPL CALC-MCNC: 93 MG/DL (ref 0–100)
POTASSIUM SERPL-SCNC: 4.6 MMOL/L (ref 3.5–5.1)
PROT SERPL-MCNC: 6.4 G/DL (ref 6.4–8.2)
SODIUM SERPL-SCNC: 139 MMOL/L (ref 136–145)
TRIGL SERPL-MCNC: 145 MG/DL
VLDLC SERPL CALC-MCNC: 29 MG/DL

## 2024-06-25 DIAGNOSIS — I47.10 SVT (SUPRAVENTRICULAR TACHYCARDIA) (HCC): ICD-10-CM

## 2024-06-26 RX ORDER — METOPROLOL SUCCINATE 25 MG/1
TABLET, EXTENDED RELEASE ORAL
Qty: 90 TABLET | Refills: 1 | Status: SHIPPED | OUTPATIENT
Start: 2024-06-26

## 2024-07-29 SDOH — ECONOMIC STABILITY: FOOD INSECURITY: WITHIN THE PAST 12 MONTHS, YOU WORRIED THAT YOUR FOOD WOULD RUN OUT BEFORE YOU GOT MONEY TO BUY MORE.: NEVER TRUE

## 2024-07-29 SDOH — ECONOMIC STABILITY: INCOME INSECURITY: HOW HARD IS IT FOR YOU TO PAY FOR THE VERY BASICS LIKE FOOD, HOUSING, MEDICAL CARE, AND HEATING?: NOT HARD AT ALL

## 2024-07-29 SDOH — ECONOMIC STABILITY: FOOD INSECURITY: WITHIN THE PAST 12 MONTHS, THE FOOD YOU BOUGHT JUST DIDN'T LAST AND YOU DIDN'T HAVE MONEY TO GET MORE.: NEVER TRUE

## 2024-07-29 SDOH — ECONOMIC STABILITY: TRANSPORTATION INSECURITY
IN THE PAST 12 MONTHS, HAS LACK OF TRANSPORTATION KEPT YOU FROM MEETINGS, WORK, OR FROM GETTING THINGS NEEDED FOR DAILY LIVING?: NO

## 2024-08-01 ENCOUNTER — OFFICE VISIT (OUTPATIENT)
Facility: CLINIC | Age: 84
End: 2024-08-01

## 2024-08-01 VITALS
HEART RATE: 62 BPM | BODY MASS INDEX: 23.13 KG/M2 | DIASTOLIC BLOOD PRESSURE: 82 MMHG | HEIGHT: 61 IN | OXYGEN SATURATION: 98 % | RESPIRATION RATE: 12 BRPM | SYSTOLIC BLOOD PRESSURE: 120 MMHG | TEMPERATURE: 97.7 F | WEIGHT: 122.5 LBS

## 2024-08-01 DIAGNOSIS — E78.00 HYPERCHOLESTEROLEMIA: ICD-10-CM

## 2024-08-01 DIAGNOSIS — F41.9 ANXIETY: ICD-10-CM

## 2024-08-01 DIAGNOSIS — R42 VERTIGO: Primary | ICD-10-CM

## 2024-08-01 DIAGNOSIS — F51.01 PRIMARY INSOMNIA: ICD-10-CM

## 2024-08-01 DIAGNOSIS — I47.10 SVT (SUPRAVENTRICULAR TACHYCARDIA) (HCC): ICD-10-CM

## 2024-08-01 RX ORDER — MECLIZINE HYDROCHLORIDE 25 MG/1
25 TABLET ORAL 3 TIMES DAILY PRN
Qty: 30 TABLET | Refills: 0 | Status: SHIPPED | OUTPATIENT
Start: 2024-08-01 | End: 2024-08-11

## 2024-08-01 NOTE — PROGRESS NOTES
Tracy Pulliam  Identified pt with two pt identifiers(name and ).     Chief Complaint   Patient presents with    Hypertension       Reviewed record In preparation for visit and have obtained necessary documentation.     1. Have you been to the ER, urgent care clinic or hospitalized since your last visit? No     2. Have you seen or consulted any other health care providers outside of the [facility] since your last visit? Include any pap smears or colon screening. Dr Ana Chaney reviewed with provider.    Health Maintenance reviewed:     Health Maintenance Due   Topic    Respiratory Syncytial Virus (RSV) Pregnant or age 60 yrs+ (1 - 1-dose 60+ series)    Flu vaccine (1)          Wt Readings from Last 3 Encounters:   24 55.1 kg (121 lb 8 oz)   23 53.9 kg (118 lb 12.8 oz)   23 53.1 kg (117 lb)        Temp Readings from Last 3 Encounters:   24 97.7 °F (36.5 °C) (Oral)   23 97.6 °F (36.4 °C) (Oral)        BP Readings from Last 3 Encounters:   24 112/70   23 110/72   23 138/80        Pulse Readings from Last 3 Encounters:   24 69   23 58   23 78      [unfilled]       Learning Assessment:   :         2023     1:00 PM   [facility] AMB LEARNING ASSESSMENT   Primary Learner Patient   level of education GRADUATED HIGH SCHOOL OR GED   Barriers Factors NONE   co-learner caregiver No   Primary Language ENGLISH   Learning Preference DEMONSTRATION   Answered By patient   Relationship to Learner SELF         Fall Risk Assessment:   :         2024    10:32 AM 2023    11:29 AM 2022    10:36 AM 2021    10:00 AM   Amb Fall Risk Assessment and TUG Test   Do you feel unsteady or are you worried about falling?  no no     2 or more falls in past year? no no     Fall with injury in past year? no no     Fall in past 12 months?   0 0   Able to walk?   Yes Yes          Abuse Screening:   :         2024    10:00 AM 2023     6:00 PM 
11:19 AM    HDL 86 02/01/2024 11:19 AM    LDL 93 02/01/2024 11:19 AM    VLDL 29 02/01/2024 11:19 AM    VLDL 12 06/13/2022 09:55 AM          ASSESSMENT/PLAN  Tracy was seen today for hypertension and dizziness.    Diagnoses and all orders for this visit:    Vertigo  -     meclizine (ANTIVERT) 25 MG tablet; Take 1 tablet by mouth 3 times daily as needed for Dizziness    SVT (supraventricular tachycardia) (HCC)    Anxiety    Hypercholesterolemia    Primary insomnia      Continue toprol daily - BP at goal and less dizziness since she's been on it - spells related to SVT not as severe as in past - new vertigo - meclizine prn  Lipids at goal last check - check yearly  Anxiety controlled - continue sertraline  Rare use ambien for insomnia - continue    Health Maintenance Due   Topic Date Due    Respiratory Syncytial Virus (RSV) Pregnant or age 60 yrs+ (1 - 1-dose 60+ series) Never done    Flu vaccine (1) 08/01/2024        Follow-up and Dispositions    Return in about 6 months (around 2/1/2025) for MEDICARE WELLNESS.            Reviewed plan of care. Patient has provided input and agrees with goals.     The nurse provided the patient and/or family with advanced directive information if needed and encouraged the patient to provide a copy to the office when available.             
NPO

## 2024-09-11 ENCOUNTER — TELEMEDICINE (OUTPATIENT)
Facility: CLINIC | Age: 84
End: 2024-09-11

## 2024-09-11 ENCOUNTER — TELEPHONE (OUTPATIENT)
Facility: CLINIC | Age: 84
End: 2024-09-11

## 2024-09-11 DIAGNOSIS — J40 BRONCHITIS: Primary | ICD-10-CM

## 2024-09-11 RX ORDER — BENZONATATE 200 MG/1
200 CAPSULE ORAL 3 TIMES DAILY PRN
Qty: 21 CAPSULE | Refills: 0 | Status: SHIPPED | OUTPATIENT
Start: 2024-09-11 | End: 2024-09-18

## 2024-09-11 RX ORDER — AZITHROMYCIN 250 MG/1
TABLET, FILM COATED ORAL
Qty: 6 TABLET | Refills: 0 | Status: SHIPPED | OUTPATIENT
Start: 2024-09-11 | End: 2024-09-21

## 2024-09-14 DIAGNOSIS — E78.00 PURE HYPERCHOLESTEROLEMIA, UNSPECIFIED: ICD-10-CM

## 2024-09-16 RX ORDER — PRAVASTATIN SODIUM 20 MG
20 TABLET ORAL NIGHTLY
Qty: 90 TABLET | Refills: 2 | Status: SHIPPED | OUTPATIENT
Start: 2024-09-16

## 2024-09-30 RX ORDER — SERTRALINE HYDROCHLORIDE 25 MG/1
25 TABLET, FILM COATED ORAL DAILY
Qty: 90 TABLET | Refills: 2 | Status: SHIPPED | OUTPATIENT
Start: 2024-09-30

## 2024-09-30 NOTE — TELEPHONE ENCOUNTER
PCP: Della Womcak MD     Last appt:  9/11/2024      Future Appointments   Date Time Provider Department Center   2/6/2025  8:50 AM Della Womack MD [facility] DEP          Requested Prescriptions     Pending Prescriptions Disp Refills    sertraline (ZOLOFT) 25 MG tablet [Pharmacy Med Name: SERTRALINE TAB 25MG] 90 tablet 2     Sig: TAKE 1 TABLET DAILY

## 2024-10-03 ENCOUNTER — TELEPHONE (OUTPATIENT)
Facility: CLINIC | Age: 84
End: 2024-10-03

## 2024-10-03 NOTE — TELEPHONE ENCOUNTER
Pt called the triage line would like to make an appt for dizziness. Pt has been having trouble with balance trouble since Sunday and wants to know if its a side effect from Meclizine. Pt also mentioned she's been having headaches on L side of her head to the back of her neck for 2 days now. Takes motrin to relieve the headaches. I told pt I would get with [facility] and someone will give her a call back. Pt verbalized understanding.

## 2024-10-07 ENCOUNTER — OFFICE VISIT (OUTPATIENT)
Facility: CLINIC | Age: 84
End: 2024-10-07
Payer: MEDICARE

## 2024-10-07 VITALS
RESPIRATION RATE: 12 BRPM | SYSTOLIC BLOOD PRESSURE: 142 MMHG | DIASTOLIC BLOOD PRESSURE: 80 MMHG | OXYGEN SATURATION: 97 % | HEART RATE: 63 BPM | BODY MASS INDEX: 23.03 KG/M2 | WEIGHT: 122 LBS | HEIGHT: 61 IN | TEMPERATURE: 98.2 F

## 2024-10-07 DIAGNOSIS — E78.00 HYPERCHOLESTEROLEMIA: ICD-10-CM

## 2024-10-07 DIAGNOSIS — R42 DIZZINESS: ICD-10-CM

## 2024-10-07 DIAGNOSIS — R42 VERTIGO: Primary | ICD-10-CM

## 2024-10-07 DIAGNOSIS — I47.10 SVT (SUPRAVENTRICULAR TACHYCARDIA) (HCC): ICD-10-CM

## 2024-10-07 LAB
ALBUMIN SERPL-MCNC: 3.4 G/DL (ref 3.5–5)
ALBUMIN/GLOB SERPL: 1.2 (ref 1.1–2.2)
ALP SERPL-CCNC: 55 U/L (ref 45–117)
ALT SERPL-CCNC: 15 U/L (ref 12–78)
ANION GAP SERPL CALC-SCNC: 2 MMOL/L (ref 2–12)
AST SERPL-CCNC: 23 U/L (ref 15–37)
BASOPHILS # BLD: 0.1 K/UL (ref 0–0.1)
BASOPHILS NFR BLD: 1 % (ref 0–1)
BILIRUB DIRECT SERPL-MCNC: 0.1 MG/DL (ref 0–0.2)
BILIRUB SERPL-MCNC: 0.3 MG/DL (ref 0.2–1)
BUN SERPL-MCNC: 18 MG/DL (ref 6–20)
BUN/CREAT SERPL: 23 (ref 12–20)
CALCIUM SERPL-MCNC: 9.2 MG/DL (ref 8.5–10.1)
CHLORIDE SERPL-SCNC: 111 MMOL/L (ref 97–108)
CO2 SERPL-SCNC: 27 MMOL/L (ref 21–32)
CREAT SERPL-MCNC: 0.8 MG/DL (ref 0.55–1.02)
DIFFERENTIAL METHOD BLD: ABNORMAL
EOSINOPHIL # BLD: 0.1 K/UL (ref 0–0.4)
EOSINOPHIL NFR BLD: 2 % (ref 0–7)
ERYTHROCYTE [DISTWIDTH] IN BLOOD BY AUTOMATED COUNT: 13.4 % (ref 11.5–14.5)
GLOBULIN SER CALC-MCNC: 2.9 G/DL (ref 2–4)
GLUCOSE SERPL-MCNC: 93 MG/DL (ref 65–100)
HCT VFR BLD AUTO: 33.4 % (ref 35–47)
HGB BLD-MCNC: 10.6 G/DL (ref 11.5–16)
IMM GRANULOCYTES # BLD AUTO: 0 K/UL (ref 0–0.04)
IMM GRANULOCYTES NFR BLD AUTO: 0 % (ref 0–0.5)
LYMPHOCYTES # BLD: 1.2 K/UL (ref 0.8–3.5)
LYMPHOCYTES NFR BLD: 31 % (ref 12–49)
MAGNESIUM SERPL-MCNC: 2.2 MG/DL (ref 1.6–2.4)
MCH RBC QN AUTO: 29.8 PG (ref 26–34)
MCHC RBC AUTO-ENTMCNC: 31.7 G/DL (ref 30–36.5)
MCV RBC AUTO: 93.8 FL (ref 80–99)
MONOCYTES # BLD: 0.3 K/UL (ref 0–1)
MONOCYTES NFR BLD: 9 % (ref 5–13)
NEUTS SEG # BLD: 2.1 K/UL (ref 1.8–8)
NEUTS SEG NFR BLD: 57 % (ref 32–75)
NRBC # BLD: 0 K/UL (ref 0–0.01)
NRBC BLD-RTO: 0 PER 100 WBC
PLATELET # BLD AUTO: 267 K/UL (ref 150–400)
PMV BLD AUTO: 10.2 FL (ref 8.9–12.9)
POTASSIUM SERPL-SCNC: 4.7 MMOL/L (ref 3.5–5.1)
PROT SERPL-MCNC: 6.3 G/DL (ref 6.4–8.2)
RBC # BLD AUTO: 3.56 M/UL (ref 3.8–5.2)
SODIUM SERPL-SCNC: 140 MMOL/L (ref 136–145)
WBC # BLD AUTO: 3.7 K/UL (ref 3.6–11)

## 2024-10-07 PROCEDURE — 1123F ACP DISCUSS/DSCN MKR DOCD: CPT | Performed by: INTERNAL MEDICINE

## 2024-10-07 PROCEDURE — 1090F PRES/ABSN URINE INCON ASSESS: CPT | Performed by: INTERNAL MEDICINE

## 2024-10-07 PROCEDURE — G8420 CALC BMI NORM PARAMETERS: HCPCS | Performed by: INTERNAL MEDICINE

## 2024-10-07 PROCEDURE — G8482 FLU IMMUNIZE ORDER/ADMIN: HCPCS | Performed by: INTERNAL MEDICINE

## 2024-10-07 PROCEDURE — G8427 DOCREV CUR MEDS BY ELIG CLIN: HCPCS | Performed by: INTERNAL MEDICINE

## 2024-10-07 PROCEDURE — 99214 OFFICE O/P EST MOD 30 MIN: CPT | Performed by: INTERNAL MEDICINE

## 2024-10-07 PROCEDURE — G8400 PT W/DXA NO RESULTS DOC: HCPCS | Performed by: INTERNAL MEDICINE

## 2024-10-07 PROCEDURE — 1036F TOBACCO NON-USER: CPT | Performed by: INTERNAL MEDICINE

## 2024-10-07 RX ORDER — MECLIZINE HYDROCHLORIDE 25 MG/1
25 TABLET ORAL 3 TIMES DAILY PRN
COMMUNITY

## 2024-10-07 NOTE — PROGRESS NOTES
Tracy Pulliam  Identified pt with two pt identifiers(name and ).     Chief Complaint   Patient presents with    Dizziness    balance problem       Reviewed record In preparation for visit and have obtained necessary documentation.     1. Have you been to the ER, urgent care clinic or hospitalized since your last visit? PT      2. Have you seen or consulted any other health care providers outside of the [facility] since your last visit? Include any pap smears or colon screening. No    Vitals reviewed with provider.    Health Maintenance reviewed:     Health Maintenance Due   Topic    Respiratory Syncytial Virus (RSV) Pregnant or age 60 yrs+ (1 - 1-dose 60+ series)    COVID-19 Vaccine (3 - 2023-24 season)          Wt Readings from Last 3 Encounters:   24 55.6 kg (122 lb 8 oz)   24 55.1 kg (121 lb 8 oz)   23 53.9 kg (118 lb 12.8 oz)        Temp Readings from Last 3 Encounters:   24 97.7 °F (36.5 °C) (Oral)   24 97.7 °F (36.5 °C) (Oral)   23 97.6 °F (36.4 °C) (Oral)        BP Readings from Last 3 Encounters:   24 120/82   24 112/70   23 110/72        Pulse Readings from Last 3 Encounters:   24 62   24 69   23 58      [unfilled]       Learning Assessment:   :         2023     1:00 PM   [facility] AMB LEARNING ASSESSMENT   Primary Learner Patient   level of education GRADUATED HIGH SCHOOL OR GED   Barriers Factors NONE   co-learner caregiver No   Primary Language ENGLISH   Learning Preference DEMONSTRATION   Answered By patient   Relationship to Learner SELF         Fall Risk Assessment:   :         2024    10:32 AM 2023    11:29 AM 2022    10:36 AM 2021    10:00 AM   Amb Fall Risk Assessment and TUG Test   Do you feel unsteady or are you worried about falling?  no no     2 or more falls in past year? no no     Fall with injury in past year? no no     Fall in past 12 months?   0 0   Able to walk?   Yes

## 2024-10-07 NOTE — PROGRESS NOTES
HPI  Ms. Tracy Pulliam is a 83 y.o. year old female, she is seen today for dizziness, balance issues.   First episode about 3-4 mos ago - lasting 15 - 20 min.   No spells until this weekend - started 8 days ago - lasted 5 days - when woke up the 6th day it was resolved.   Woke feeling normal on the first day. Was using leaf blower, went upstairs to clean office, up and down emptying trash. Then was stumbling and felt like she was going to pass out - heart was beating faster but now irregularly.   Went home after about 90 min - as long as she was sitting she felt fine.   Took a nap, went back to clean the office but symptoms persisted once she went back to work.   Symptoms occurred only when moving. No issues lying on back.   Took meclizine but doesn't think it helped. Took it bid for several days.   Was eating and drinking normally.   On recall has been having spells > 10 years - worse in past, got better - but this was the longest spell.     Cardiology note 8/2023:  Impression SVT transient episode Holter and echo are normal has some LARSEN Advair at higher levels but not usual levels of exertion. Nothing to suggest angina based on her symptoms with no tightness or pressure. At this point work-up is also included lab work with PCP which has been favorable with no anemia or kidney dysfunction. We will stop her metoprolol she can use that as needed if she develops tachycardia again. I will see her back as needed and she will continue follow-up with Dr. Womack     She has been taking metoprolol 12.5mg daily for years.    systolic, then 128 systolic when checked day 3 of symptoms  Never took it during a spell of dizziness  No focal weakness or confusion.         Chief Complaint   Patient presents with    Dizziness    balance problem        Prior to Admission medications    Medication Sig Start Date End Date Taking? Authorizing Provider   meclizine (ANTIVERT) 25 MG tablet Take 1 tablet by mouth 3 times daily as

## 2024-10-08 LAB — TSH SERPL DL<=0.05 MIU/L-ACNC: 2.12 UIU/ML (ref 0.45–4.5)

## 2024-10-09 DIAGNOSIS — D64.9 ANEMIA, UNSPECIFIED TYPE: Primary | ICD-10-CM

## 2024-10-09 DIAGNOSIS — D64.9 ANEMIA, UNSPECIFIED TYPE: ICD-10-CM

## 2024-10-17 ENCOUNTER — LAB (OUTPATIENT)
Facility: CLINIC | Age: 84
End: 2024-10-17

## 2024-10-17 DIAGNOSIS — D64.9 ANEMIA, UNSPECIFIED TYPE: ICD-10-CM

## 2024-10-17 LAB
BASOPHILS # BLD: 0 K/UL (ref 0–0.1)
BASOPHILS NFR BLD: 1 % (ref 0–1)
DIFFERENTIAL METHOD BLD: ABNORMAL
EOSINOPHIL # BLD: 0.1 K/UL (ref 0–0.4)
EOSINOPHIL NFR BLD: 2 % (ref 0–7)
ERYTHROCYTE [DISTWIDTH] IN BLOOD BY AUTOMATED COUNT: 13.6 % (ref 11.5–14.5)
FERRITIN SERPL-MCNC: 29 NG/ML (ref 8–252)
HCT VFR BLD AUTO: 36.7 % (ref 35–47)
HGB BLD-MCNC: 11.4 G/DL (ref 11.5–16)
IMM GRANULOCYTES # BLD AUTO: 0 K/UL (ref 0–0.04)
IMM GRANULOCYTES NFR BLD AUTO: 0 % (ref 0–0.5)
IRON SATN MFR SERPL: 16 % (ref 20–50)
IRON SERPL-MCNC: 50 UG/DL (ref 35–150)
LYMPHOCYTES # BLD: 1 K/UL (ref 0.8–3.5)
LYMPHOCYTES NFR BLD: 23 % (ref 12–49)
MCH RBC QN AUTO: 29.7 PG (ref 26–34)
MCHC RBC AUTO-ENTMCNC: 31.1 G/DL (ref 30–36.5)
MCV RBC AUTO: 95.6 FL (ref 80–99)
MONOCYTES # BLD: 0.5 K/UL (ref 0–1)
MONOCYTES NFR BLD: 11 % (ref 5–13)
NEUTS SEG # BLD: 2.9 K/UL (ref 1.8–8)
NEUTS SEG NFR BLD: 63 % (ref 32–75)
NRBC # BLD: 0 K/UL (ref 0–0.01)
NRBC BLD-RTO: 0 PER 100 WBC
PLATELET # BLD AUTO: 261 K/UL (ref 150–400)
PMV BLD AUTO: 10 FL (ref 8.9–12.9)
RBC # BLD AUTO: 3.84 M/UL (ref 3.8–5.2)
TIBC SERPL-MCNC: 321 UG/DL (ref 250–450)
VIT B12 SERPL-MCNC: 1649 PG/ML (ref 193–986)
WBC # BLD AUTO: 4.4 K/UL (ref 3.6–11)

## 2024-10-22 LAB — HEMOCCULT STL QL IA: NEGATIVE

## 2024-11-17 DIAGNOSIS — I47.10 SVT (SUPRAVENTRICULAR TACHYCARDIA) (HCC): ICD-10-CM

## 2024-11-18 RX ORDER — METOPROLOL SUCCINATE 25 MG/1
TABLET, EXTENDED RELEASE ORAL
Qty: 90 TABLET | Refills: 1 | Status: SHIPPED | OUTPATIENT
Start: 2024-11-18

## 2024-12-01 ENCOUNTER — OFFICE VISIT (OUTPATIENT)
Age: 84
End: 2024-12-01

## 2024-12-01 VITALS
HEART RATE: 58 BPM | RESPIRATION RATE: 16 BRPM | BODY MASS INDEX: 24 KG/M2 | SYSTOLIC BLOOD PRESSURE: 160 MMHG | WEIGHT: 127 LBS | TEMPERATURE: 97.5 F | DIASTOLIC BLOOD PRESSURE: 70 MMHG | OXYGEN SATURATION: 98 %

## 2024-12-01 DIAGNOSIS — S61.212A LACERATION OF RIGHT MIDDLE FINGER WITHOUT FOREIGN BODY WITHOUT DAMAGE TO NAIL, INITIAL ENCOUNTER: Primary | ICD-10-CM

## 2025-02-09 SDOH — ECONOMIC STABILITY: INCOME INSECURITY: IN THE LAST 12 MONTHS, WAS THERE A TIME WHEN YOU WERE NOT ABLE TO PAY THE MORTGAGE OR RENT ON TIME?: NO

## 2025-02-09 SDOH — ECONOMIC STABILITY: TRANSPORTATION INSECURITY
IN THE PAST 12 MONTHS, HAS THE LACK OF TRANSPORTATION KEPT YOU FROM MEDICAL APPOINTMENTS OR FROM GETTING MEDICATIONS?: NO

## 2025-02-09 SDOH — ECONOMIC STABILITY: FOOD INSECURITY: WITHIN THE PAST 12 MONTHS, THE FOOD YOU BOUGHT JUST DIDN'T LAST AND YOU DIDN'T HAVE MONEY TO GET MORE.: NEVER TRUE

## 2025-02-09 SDOH — HEALTH STABILITY: PHYSICAL HEALTH: ON AVERAGE, HOW MANY DAYS PER WEEK DO YOU ENGAGE IN MODERATE TO STRENUOUS EXERCISE (LIKE A BRISK WALK)?: 2 DAYS

## 2025-02-09 SDOH — ECONOMIC STABILITY: FOOD INSECURITY: WITHIN THE PAST 12 MONTHS, YOU WORRIED THAT YOUR FOOD WOULD RUN OUT BEFORE YOU GOT MONEY TO BUY MORE.: NEVER TRUE

## 2025-02-09 SDOH — HEALTH STABILITY: PHYSICAL HEALTH: ON AVERAGE, HOW MANY MINUTES DO YOU ENGAGE IN EXERCISE AT THIS LEVEL?: 30 MIN

## 2025-02-09 ASSESSMENT — PATIENT HEALTH QUESTIONNAIRE - PHQ9
SUM OF ALL RESPONSES TO PHQ QUESTIONS 1-9: 0
2. FEELING DOWN, DEPRESSED OR HOPELESS: NOT AT ALL
SUM OF ALL RESPONSES TO PHQ QUESTIONS 1-9: 0
SUM OF ALL RESPONSES TO PHQ QUESTIONS 1-9: 0
1. LITTLE INTEREST OR PLEASURE IN DOING THINGS: NOT AT ALL
SUM OF ALL RESPONSES TO PHQ QUESTIONS 1-9: 0

## 2025-02-09 ASSESSMENT — LIFESTYLE VARIABLES
HOW OFTEN DO YOU HAVE A DRINK CONTAINING ALCOHOL: NEVER
HOW MANY STANDARD DRINKS CONTAINING ALCOHOL DO YOU HAVE ON A TYPICAL DAY: PATIENT DOES NOT DRINK

## 2025-02-20 DIAGNOSIS — E78.00 PURE HYPERCHOLESTEROLEMIA, UNSPECIFIED: ICD-10-CM

## 2025-02-20 RX ORDER — PRAVASTATIN SODIUM 20 MG
20 TABLET ORAL
Qty: 90 TABLET | Refills: 2 | Status: SHIPPED | OUTPATIENT
Start: 2025-02-20

## 2025-02-20 NOTE — TELEPHONE ENCOUNTER
PCP: Della Womack MD     Last appt:  10/7/2024      Future Appointments   Date Time Provider Department Center   3/13/2025  9:30 AM Della Womack MD [facility] DEP          Requested Prescriptions     Pending Prescriptions Disp Refills    pravastatin (PRAVACHOL) 20 MG tablet [Pharmacy Med Name: PRAVASTATIN SODIUM 20 MG TAB] 90 tablet 2     Sig: TAKE 1 TABLET BY MOUTH EVERYDAY AT BEDTIME

## 2025-03-10 SDOH — HEALTH STABILITY: PHYSICAL HEALTH: ON AVERAGE, HOW MANY MINUTES DO YOU ENGAGE IN EXERCISE AT THIS LEVEL?: 30 MIN

## 2025-03-10 SDOH — HEALTH STABILITY: PHYSICAL HEALTH: ON AVERAGE, HOW MANY DAYS PER WEEK DO YOU ENGAGE IN MODERATE TO STRENUOUS EXERCISE (LIKE A BRISK WALK)?: 2 DAYS

## 2025-03-10 ASSESSMENT — PATIENT HEALTH QUESTIONNAIRE - PHQ9
SUM OF ALL RESPONSES TO PHQ QUESTIONS 1-9: 0
1. LITTLE INTEREST OR PLEASURE IN DOING THINGS: NOT AT ALL
SUM OF ALL RESPONSES TO PHQ QUESTIONS 1-9: 0
2. FEELING DOWN, DEPRESSED OR HOPELESS: NOT AT ALL

## 2025-03-10 ASSESSMENT — LIFESTYLE VARIABLES
HOW OFTEN DO YOU HAVE A DRINK CONTAINING ALCOHOL: 1
HOW MANY STANDARD DRINKS CONTAINING ALCOHOL DO YOU HAVE ON A TYPICAL DAY: PATIENT DOES NOT DRINK
HOW MANY STANDARD DRINKS CONTAINING ALCOHOL DO YOU HAVE ON A TYPICAL DAY: 0
HOW OFTEN DO YOU HAVE A DRINK CONTAINING ALCOHOL: NEVER
HOW OFTEN DO YOU HAVE SIX OR MORE DRINKS ON ONE OCCASION: 1

## 2025-03-13 ENCOUNTER — OFFICE VISIT (OUTPATIENT)
Facility: CLINIC | Age: 85
End: 2025-03-13

## 2025-03-13 VITALS
HEIGHT: 61 IN | DIASTOLIC BLOOD PRESSURE: 78 MMHG | TEMPERATURE: 97.1 F | OXYGEN SATURATION: 95 % | WEIGHT: 124.4 LBS | BODY MASS INDEX: 23.49 KG/M2 | RESPIRATION RATE: 14 BRPM | SYSTOLIC BLOOD PRESSURE: 132 MMHG | HEART RATE: 58 BPM

## 2025-03-13 DIAGNOSIS — G47.00 INSOMNIA, UNSPECIFIED TYPE: ICD-10-CM

## 2025-03-13 DIAGNOSIS — I47.10 SVT (SUPRAVENTRICULAR TACHYCARDIA): ICD-10-CM

## 2025-03-13 DIAGNOSIS — D64.9 ANEMIA, UNSPECIFIED TYPE: ICD-10-CM

## 2025-03-13 DIAGNOSIS — E28.39 ESTROGEN DEFICIENCY: ICD-10-CM

## 2025-03-13 DIAGNOSIS — F51.01 PRIMARY INSOMNIA: ICD-10-CM

## 2025-03-13 DIAGNOSIS — F41.9 ANXIETY: ICD-10-CM

## 2025-03-13 DIAGNOSIS — E55.9 VITAMIN D DEFICIENCY: ICD-10-CM

## 2025-03-13 DIAGNOSIS — Z00.00 MEDICARE ANNUAL WELLNESS VISIT, SUBSEQUENT: Primary | ICD-10-CM

## 2025-03-13 DIAGNOSIS — E78.00 HYPERCHOLESTEROLEMIA: ICD-10-CM

## 2025-03-13 RX ORDER — METOPROLOL SUCCINATE 25 MG/1
12.5 TABLET, EXTENDED RELEASE ORAL DAILY
Qty: 90 TABLET | Refills: 1 | Status: SHIPPED | OUTPATIENT
Start: 2025-03-13

## 2025-03-13 RX ORDER — SERTRALINE HYDROCHLORIDE 25 MG/1
25 TABLET, FILM COATED ORAL DAILY
Qty: 90 TABLET | Refills: 2 | Status: SHIPPED | OUTPATIENT
Start: 2025-03-13

## 2025-03-13 SDOH — ECONOMIC STABILITY: FOOD INSECURITY: WITHIN THE PAST 12 MONTHS, THE FOOD YOU BOUGHT JUST DIDN'T LAST AND YOU DIDN'T HAVE MONEY TO GET MORE.: NEVER TRUE

## 2025-03-13 SDOH — ECONOMIC STABILITY: FOOD INSECURITY: WITHIN THE PAST 12 MONTHS, YOU WORRIED THAT YOUR FOOD WOULD RUN OUT BEFORE YOU GOT MONEY TO BUY MORE.: NEVER TRUE

## 2025-03-13 NOTE — PROGRESS NOTES
Medicare Annual Wellness Visit    Tracy Pulliam is here for Medicare AWV    Assessment & Plan   Medicare annual wellness visit, subsequent  SVT (supraventricular tachycardia)  Comments:  Dr. Harp -taking metoprolol daily now with good results  Orders:  -     metoprolol succinate (TOPROL XL) 25 MG extended release tablet; Take 0.5 tablets by mouth daily, Disp-90 tablet, R-1Normal  Primary insomnia  Anxiety  -     sertraline (ZOLOFT) 25 MG tablet; Take 1 tablet by mouth daily, Disp-90 tablet, R-2Normal  Hypercholesterolemia  -     Comprehensive Metabolic Panel; Future  -     Lipid Panel; Future  Anemia, unspecified type  -     CBC with Auto Differential; Future  -     Iron and TIBC; Future  -     Ferritin; Future  Vitamin D deficiency  -     Vitamin D 25 Hydroxy; Future  Insomnia, unspecified type  Estrogen deficiency  -     DEXA BONE DENSITY AXIAL SKELETON; Future       Anxiety controlled - continue sertraline  No recent BMD - last with [facility] in 2018 - I will order  Mild anemia - recheck - stool heme negative - B12 normal  Insomnia stable with rare use of ambien  Lipids have been at goal on statin - check yearly    Return in about 6 months (around 9/13/2025) for ANXIETY, HTN.     Subjective   The following acute and/or chronic problems were also addressed today:  Plan last visit:  Continue toprol daily - BP at goal and less dizziness since she's been on it - spells related to SVT not as severe as in past - new vertigo - meclizine prn  Lipids at goal last check - check yearly  Anxiety controlled - continue sertraline  Rare use ambien for insomnia - continue    Today:   No chest pain, sob, dizziness, weakness.   Has occasional HA - notices it occurs when she wears certain pair of glasses- went to eye doctor and has new prescription and no more HA.   Anxiety controlled - no panic attacks, not sad, down or depressed - feels \"calm\" all the time  Hasn't been checking BP at home.   Rare use of ambien for

## 2025-03-24 ENCOUNTER — LAB (OUTPATIENT)
Facility: CLINIC | Age: 85
End: 2025-03-24

## 2025-03-24 DIAGNOSIS — E78.00 HYPERCHOLESTEROLEMIA: ICD-10-CM

## 2025-03-24 DIAGNOSIS — E55.9 VITAMIN D DEFICIENCY: ICD-10-CM

## 2025-03-24 DIAGNOSIS — D64.9 ANEMIA, UNSPECIFIED TYPE: ICD-10-CM

## 2025-03-25 LAB
25(OH)D3 SERPL-MCNC: 40.3 NG/ML (ref 30–100)
ALBUMIN SERPL-MCNC: 3.4 G/DL (ref 3.5–5)
ALBUMIN/GLOB SERPL: 1.1 (ref 1.1–2.2)
ALP SERPL-CCNC: 50 U/L (ref 45–117)
ALT SERPL-CCNC: 18 U/L (ref 12–78)
ANION GAP SERPL CALC-SCNC: 4 MMOL/L (ref 2–12)
AST SERPL-CCNC: 24 U/L (ref 15–37)
BASOPHILS # BLD: 0.05 K/UL (ref 0–0.1)
BASOPHILS NFR BLD: 1.1 % (ref 0–1)
BILIRUB SERPL-MCNC: 0.4 MG/DL (ref 0.2–1)
BUN SERPL-MCNC: 20 MG/DL (ref 6–20)
BUN/CREAT SERPL: 26 (ref 12–20)
CALCIUM SERPL-MCNC: 9.3 MG/DL (ref 8.5–10.1)
CHLORIDE SERPL-SCNC: 108 MMOL/L (ref 97–108)
CHOLEST SERPL-MCNC: 232 MG/DL
CO2 SERPL-SCNC: 28 MMOL/L (ref 21–32)
CREAT SERPL-MCNC: 0.78 MG/DL (ref 0.55–1.02)
DIFFERENTIAL METHOD BLD: ABNORMAL
EOSINOPHIL # BLD: 0.09 K/UL (ref 0–0.4)
EOSINOPHIL NFR BLD: 1.9 % (ref 0–7)
ERYTHROCYTE [DISTWIDTH] IN BLOOD BY AUTOMATED COUNT: 14.1 % (ref 11.5–14.5)
FERRITIN SERPL-MCNC: 26 NG/ML (ref 8–252)
GLOBULIN SER CALC-MCNC: 3.2 G/DL (ref 2–4)
GLUCOSE SERPL-MCNC: 92 MG/DL (ref 65–100)
HCT VFR BLD AUTO: 36.4 % (ref 35–47)
HDLC SERPL-MCNC: 96 MG/DL
HDLC SERPL: 2.4 (ref 0–5)
HGB BLD-MCNC: 11.6 G/DL (ref 11.5–16)
IMM GRANULOCYTES # BLD AUTO: 0 K/UL (ref 0–0.04)
IMM GRANULOCYTES NFR BLD AUTO: 0 % (ref 0–0.5)
IRON SATN MFR SERPL: 23 % (ref 20–50)
IRON SERPL-MCNC: 82 UG/DL (ref 35–150)
LDLC SERPL CALC-MCNC: 107.8 MG/DL (ref 0–100)
LYMPHOCYTES # BLD: 1.23 K/UL (ref 0.8–3.5)
LYMPHOCYTES NFR BLD: 25.8 % (ref 12–49)
MCH RBC QN AUTO: 29.8 PG (ref 26–34)
MCHC RBC AUTO-ENTMCNC: 31.9 G/DL (ref 30–36.5)
MCV RBC AUTO: 93.6 FL (ref 80–99)
MONOCYTES # BLD: 0.43 K/UL (ref 0–1)
MONOCYTES NFR BLD: 9 % (ref 5–13)
NEUTS SEG # BLD: 2.96 K/UL (ref 1.8–8)
NEUTS SEG NFR BLD: 62.2 % (ref 32–75)
NRBC # BLD: 0 K/UL (ref 0–0.01)
NRBC BLD-RTO: 0 PER 100 WBC
PLATELET # BLD AUTO: 300 K/UL (ref 150–400)
PMV BLD AUTO: 9.4 FL (ref 8.9–12.9)
POTASSIUM SERPL-SCNC: 4.1 MMOL/L (ref 3.5–5.1)
PROT SERPL-MCNC: 6.6 G/DL (ref 6.4–8.2)
RBC # BLD AUTO: 3.89 M/UL (ref 3.8–5.2)
SODIUM SERPL-SCNC: 140 MMOL/L (ref 136–145)
TIBC SERPL-MCNC: 357 UG/DL (ref 250–450)
TRIGL SERPL-MCNC: 141 MG/DL
VLDLC SERPL CALC-MCNC: 28.2 MG/DL
WBC # BLD AUTO: 4.8 K/UL (ref 3.6–11)

## 2025-03-26 ENCOUNTER — RESULTS FOLLOW-UP (OUTPATIENT)
Facility: CLINIC | Age: 85
End: 2025-03-26

## 2025-04-28 ENCOUNTER — HOSPITAL ENCOUNTER (OUTPATIENT)
Facility: HOSPITAL | Age: 85
Discharge: HOME | End: 2025-05-01
Attending: INTERNAL MEDICINE
Payer: MEDICARE

## 2025-04-28 DIAGNOSIS — E28.39 ESTROGEN DEFICIENCY: ICD-10-CM

## 2025-04-28 PROCEDURE — 77080 DXA BONE DENSITY AXIAL: CPT

## 2025-06-23 DIAGNOSIS — E78.00 PURE HYPERCHOLESTEROLEMIA, UNSPECIFIED: ICD-10-CM

## 2025-06-24 RX ORDER — PRAVASTATIN SODIUM 20 MG
20 TABLET ORAL NIGHTLY
Qty: 90 TABLET | Refills: 2 | Status: SHIPPED | OUTPATIENT
Start: 2025-06-24

## 2025-06-24 NOTE — TELEPHONE ENCOUNTER
PCP: Della Womack MD     Last appt:  3/13/2025      Future Appointments   Date Time Provider Department Center   9/17/2025 10:30 AM Della Womack MD [facility] DEP          Requested Prescriptions     Pending Prescriptions Disp Refills    pravastatin (PRAVACHOL) 20 MG tablet [Pharmacy Med Name: PRAVASTATIN  TAB 20MG] 90 tablet 2     Sig: TAKE 1 TABLET NIGHTLY